# Patient Record
Sex: MALE | Race: WHITE | NOT HISPANIC OR LATINO | Employment: FULL TIME | URBAN - METROPOLITAN AREA
[De-identification: names, ages, dates, MRNs, and addresses within clinical notes are randomized per-mention and may not be internally consistent; named-entity substitution may affect disease eponyms.]

---

## 2023-02-23 LAB
ALBUMIN (MG/L) IN URINE: 7.7 MG/L
ALBUMIN/CREATININE (UG/MG) IN URINE: 5.5 UG/MG CRT (ref 0–30)
ANION GAP IN SER/PLAS: 8 MMOL/L (ref 10–20)
CALCIUM (MG/DL) IN SER/PLAS: 9.5 MG/DL (ref 8.6–10.3)
CARBON DIOXIDE, TOTAL (MMOL/L) IN SER/PLAS: 32 MMOL/L (ref 21–32)
CHLORIDE (MMOL/L) IN SER/PLAS: 102 MMOL/L (ref 98–107)
CHOLESTEROL (MG/DL) IN SER/PLAS: 185 MG/DL (ref 0–199)
CHOLESTEROL IN HDL (MG/DL) IN SER/PLAS: 48.9 MG/DL
CHOLESTEROL/HDL RATIO: 3.8
CREATININE (MG/DL) IN SER/PLAS: 0.83 MG/DL (ref 0.5–1.3)
CREATININE (MG/DL) IN URINE: 140 MG/DL (ref 20–370)
ESTIMATED AVERAGE GLUCOSE FOR HBA1C: 194 MG/DL
GFR MALE: >90 ML/MIN/1.73M2
GLUCOSE (MG/DL) IN SER/PLAS: 140 MG/DL (ref 74–99)
HEMOGLOBIN A1C/HEMOGLOBIN TOTAL IN BLOOD: 8.4 %
LDL: 114 MG/DL (ref 0–99)
POTASSIUM (MMOL/L) IN SER/PLAS: 4 MMOL/L (ref 3.5–5.3)
SODIUM (MMOL/L) IN SER/PLAS: 138 MMOL/L (ref 136–145)
THYROTROPIN (MIU/L) IN SER/PLAS BY DETECTION LIMIT <= 0.05 MIU/L: 1.04 MIU/L (ref 0.44–3.98)
TRIGLYCERIDE (MG/DL) IN SER/PLAS: 112 MG/DL (ref 0–149)
UREA NITROGEN (MG/DL) IN SER/PLAS: 12 MG/DL (ref 6–23)
VLDL: 22 MG/DL (ref 0–40)

## 2023-02-24 LAB — THYROPEROXIDASE AB (IU/ML) IN SER/PLAS: <28 IU/ML

## 2023-12-05 ENCOUNTER — APPOINTMENT (OUTPATIENT)
Dept: ENDOCRINOLOGY | Facility: CLINIC | Age: 28
End: 2023-12-05

## 2023-12-05 NOTE — PROGRESS NOTES
Subjective   Migel Singh is a 28 y.o. male who presents for a follow-up evaluation of Type 1 diabetes mellitus. The initial diagnosis of diabetes was made {ago/age:57005}. The patient {DOES/ DOES NOT:54074} have a known family history of diabetes.    Known complications due to diabetes included {Diagnoses; complications diabetes:1215}    Cardiovascular risk factors include {risk factors heart disease:510}. The patient {is/is not:65889} on an ACE inhibitor or angiotensin II receptor blocker.  The patient {HAS HAS NOT:71436} been previously hospitalized due to diabetic ketoacidosis.     Current symptoms/problems include {Symptoms; diabetes:73616}. His {course:30560}.     Current diabetes regimen is as follows: {therapy; diabetes meds:29474}     The patient {is/is not:69752} currently checking the blood glucose *** times per day.    Patient is using: {glucometer/continuous glucose monitor:08836}    Hypoglycemia frequency: ***  Hypoglycemia awareness: {YES/NO:39221}  Glucagon prescription:  {YES,NO:06627}    He works for [employer] Tuesday through Friday from 7am to 4pm. 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   5%of insulin is via bolus.     74% of the week is spent in auto mode     No carbs are recorded in the pump.      MEALS:   Breakfast - omits  Lunch - salad, apple, mandarin orange   DinnerÂ Â 6pm- chicken, green beans, corn   Snacks - fiber one bars, slim Rambo,   Beverages - energy drinks, Monster every 2 days, water with flavored packets, Gatorade zero, regular soda      Denies exercise regimen     Review of Systems    Objective   There were no vitals taken for this visit.  Physical Exam  Vitals and nursing note reviewed.    Constitutional:       General: He is not in acute distress.     Appearance: Normal appearance. He is normal weight.   HENT:      Head: Normocephalic and atraumatic.      Nose: Nose normal.      Mouth/Throat:      Mouth: Mucous membranes are moist.   Eyes:      Extraocular Movements: Extraocular movements intact.   Cardiovascular:      Rate and Rhythm: Normal rate and regular rhythm.   Pulmonary:      Effort: Pulmonary effort is normal.      Breath sounds: Normal breath sounds.   Musculoskeletal:         General: Normal range of motion.   Skin:     General: Skin is warm.   Neurological:      Mental Status: He is alert and oriented to person, place, and time.   Psychiatric:         Mood and Affect: Mood normal.         Lab Review  {Use .RESUFAST to pull relevant lab results into your note (This text will disappear):44463}    Health Maintenance:   Foot Exam:   Eye Exam:  Lipid Panel:  Urine Albumin:    Assessment/Plan      27-year-old presents for follow up for type 1 diabetes mellitus. His hemoglobin A1c was found to be 16.1% in January 2022. He was started on the Omnipod insulin pump. He is putting no carbs in the pump and thus is getting considerably more basal insulin compared to bolus.

## 2023-12-19 ENCOUNTER — OFFICE VISIT (OUTPATIENT)
Dept: ENDOCRINOLOGY | Facility: CLINIC | Age: 28
End: 2023-12-19
Payer: COMMERCIAL

## 2023-12-19 VITALS
HEART RATE: 94 BPM | SYSTOLIC BLOOD PRESSURE: 120 MMHG | DIASTOLIC BLOOD PRESSURE: 78 MMHG | HEIGHT: 68 IN | BODY MASS INDEX: 25.01 KG/M2 | WEIGHT: 165 LBS

## 2023-12-19 DIAGNOSIS — E10.9 TYPE 1 DIABETES MELLITUS WITHOUT COMPLICATION (MULTI): Primary | ICD-10-CM

## 2023-12-19 LAB — POC HEMOGLOBIN A1C: 12.6 % (ref 4.2–6.5)

## 2023-12-19 PROCEDURE — 95251 CONT GLUC MNTR ANALYSIS I&R: CPT | Performed by: INTERNAL MEDICINE

## 2023-12-19 PROCEDURE — 83036 HEMOGLOBIN GLYCOSYLATED A1C: CPT | Performed by: INTERNAL MEDICINE

## 2023-12-19 PROCEDURE — 3052F HG A1C>EQUAL 8.0%<EQUAL 9.0%: CPT | Performed by: INTERNAL MEDICINE

## 2023-12-19 PROCEDURE — 3074F SYST BP LT 130 MM HG: CPT | Performed by: INTERNAL MEDICINE

## 2023-12-19 PROCEDURE — 1036F TOBACCO NON-USER: CPT | Performed by: INTERNAL MEDICINE

## 2023-12-19 PROCEDURE — 3078F DIAST BP <80 MM HG: CPT | Performed by: INTERNAL MEDICINE

## 2023-12-19 PROCEDURE — 99214 OFFICE O/P EST MOD 30 MIN: CPT | Performed by: INTERNAL MEDICINE

## 2023-12-19 RX ORDER — BLOOD-GLUCOSE SENSOR
EACH MISCELLANEOUS
Qty: 10 EACH | Refills: 11 | Status: SHIPPED | OUTPATIENT
Start: 2023-12-19 | End: 2024-04-02 | Stop reason: SDUPTHER

## 2023-12-19 RX ORDER — INSULIN LISPRO 100 [IU]/ML
INJECTION, SOLUTION INTRAVENOUS; SUBCUTANEOUS
Status: ON HOLD | COMMUNITY
End: 2024-03-06 | Stop reason: ALTCHOICE

## 2023-12-19 RX ORDER — BLOOD-GLUCOSE SENSOR
EACH MISCELLANEOUS
COMMUNITY
End: 2023-12-19 | Stop reason: SDUPTHER

## 2023-12-19 RX ORDER — INSULIN PMP CART,AUT,G6/7,CNTR
1 EACH SUBCUTANEOUS
Qty: 10 EACH | Refills: 11 | Status: SHIPPED | OUTPATIENT
Start: 2023-12-19 | End: 2024-01-26 | Stop reason: SDUPTHER

## 2023-12-19 RX ORDER — LISDEXAMFETAMINE DIMESYLATE 70 MG/1
70 CAPSULE ORAL EVERY MORNING
COMMUNITY

## 2023-12-19 RX ORDER — FLUOXETINE HYDROCHLORIDE 20 MG/1
20 CAPSULE ORAL DAILY
COMMUNITY

## 2023-12-19 NOTE — PATIENT INSTRUCTIONS
Thank you for choosing [facility]  for your health care needs.  If you have any questions, concerns or medical needs, please feel free to contact our office at [phone].    Please ensure you complete your blood work one week before the next scheduled appointment.    For follow up in 3-4 months   To continue the use of your Dexcom CGM for the intensive glucose monitoring due to the dynamic nature of insulin requirements, the narrow therapeutic index of insulin and the potentially fatal consequences of treatment  To continue the use of the  Omnipod insulin pump   To put a new carb ratio at 18:00 to 12 grams

## 2023-12-19 NOTE — PROGRESS NOTES
"Subjective   Migel Singh is a 28 y.o. male who presents for a follow-up evaluation of Type 1 diabetes mellitus. The initial diagnosis of diabetes was made  at the age of 24 years .     He has had no major changes to his health since his last appointment.  He states that his cuts that take a long time to heel or will get infected.      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      He has not had the Dexcom CGM for one month     Known complications due to diabetes included none    Cardiovascular risk factors include diabetes mellitus and male gender. The patient is not on an ACE inhibitor or angiotensin II receptor blocker.  The patient has not been previously hospitalized due to diabetic ketoacidosis.     Current symptoms/problems include hyperglycemia. His clinical course has been stable.     The patient is currently checking the blood glucose multiple times per day.    Patient is using: continuous glucose monitor  DEXCOM G6 CGM DATA:  Average 298 +/- 85mg/dL  0% of values below target range  10% of values within target range  90% of values above target range    Hypoglycemia frequency: 0%  Hypoglycemia awareness: Yes      OMNIPOD SETTINGS:  Basal - 28.1 units/day  MN 1.2 units/hr  12:00 1 unit/hr  15:30 1.2 units/hr     I:C 15 grams     Sensitivity 70mg/dL      Target 130mg/dL      AIT - 3 hours         MEALS:   Breakfast - omits  Lunch - goes home for luch; granola bar   Dinner6:30pm- chicken, steak, potaotes  Snacks - fiber one bars, slim Rambo,   Beverages - energy drinks, Monster every 2 days, water with flavored packets, Gatorade zero, regular soda, coffee with creamer      Denies exercise regimen       Objective   /78 (BP Location: Left arm, Patient Position: Sitting, BP Cuff Size: Adult)   Pulse 94   Ht 1.727 m (5' 8\")   Wt 74.8 kg (165 lb)   BMI 25.09 kg/m²   Physical Exam  Vitals and nursing note reviewed.   Constitutional:       General: He is not in acute distress.     Appearance: Normal " "appearance. He is normal weight.   HENT:      Head: Normocephalic and atraumatic.      Nose: Nose normal.      Mouth/Throat:      Mouth: Mucous membranes are moist.   Eyes:      Extraocular Movements: Extraocular movements intact.   Cardiovascular:      Rate and Rhythm: Normal rate and regular rhythm.   Pulmonary:      Effort: Pulmonary effort is normal.      Breath sounds: Normal breath sounds.   Musculoskeletal:         General: Normal range of motion.   Skin:     General: Skin is warm.   Neurological:      Mental Status: He is alert and oriented to person, place, and time.   Psychiatric:         Mood and Affect: Mood normal.         Lab Review  Lab Results   Component Value Date    HGBA1C 12.6 (A) 12/19/2023     Lab Results   Component Value Date    GLUCOSE 140 (H) 02/23/2023    CALCIUM 9.5 02/23/2023     02/23/2023    K 4.0 02/23/2023    CO2 32 02/23/2023     02/23/2023    BUN 12 02/23/2023    CREATININE 0.83 02/23/2023     Lab Results   Component Value Date    CHOL 185 02/23/2023    CHOL 171 03/25/2021    CHOL 168 02/25/2020     Lab Results   Component Value Date    HDL 48.9 02/23/2023    HDL 41.2 03/25/2021    HDL 45.1 02/25/2020     No results found for: \"LDLCALC\"  Lab Results   Component Value Date    TRIG 112 02/23/2023    TRIG 87 03/25/2021    TRIG 133 02/25/2020     No components found for: \"CHOLHDL\"      Health Maintenance:   Foot Exam: August 8, 2023  Eye Exam:  Urine Albumin:  February 23, 2023    Assessment/Plan      28-year-old presents for follow up for type 1 diabetes mellitus. His blood pressure is at goal today.    Poorly controlled type 1 diabetes mellitus (CMS/[facility])    To continue the use of your Dexcom CGM for the intensive glucose monitoring due to the dynamic nature of insulin requirements, the narrow therapeutic index of insulin and the potentially fatal consequences of treatment  To continue the use of the  Omnipod insulin pump   To put a new carb ratio at 18:00 to 12 grams "   All other pump settings remain the same  Counseled that the goal A1C should be 7% or less  Counseled glycemic control is warranted to prevent microvascular complications      Insulin pump titration  To change pump sites every three days  To utilize automode for improved glycemic control    For follow up in 3-4 months

## 2023-12-21 PROBLEM — Z46.81 INSULIN PUMP TITRATION: Status: ACTIVE | Noted: 2023-12-21

## 2023-12-21 PROBLEM — E10.65 POORLY CONTROLLED TYPE 1 DIABETES MELLITUS (MULTI): Status: ACTIVE | Noted: 2023-12-21

## 2023-12-22 NOTE — ASSESSMENT & PLAN NOTE
To continue the use of your Dexcom CGM for the intensive glucose monitoring due to the dynamic nature of insulin requirements, the narrow therapeutic index of insulin and the potentially fatal consequences of treatment  To continue the use of the  Omnipod insulin pump   To put a new carb ratio at 18:00 to 12 grams   All other pump settings remain the same  Counseled that the goal A1C should be 7% or less  Counseled glycemic control is warranted to prevent microvascular complications

## 2023-12-22 NOTE — ASSESSMENT & PLAN NOTE
To change pump sites every three days  To utilize automode for improved glycemic control    For follow up in 3-4 months

## 2023-12-26 RX ORDER — BLOOD-GLUCOSE TRANSMITTER
EACH MISCELLANEOUS
COMMUNITY
Start: 2023-11-03 | End: 2024-04-03 | Stop reason: SDUPTHER

## 2023-12-26 NOTE — TELEPHONE ENCOUNTER
Patient called to request dexcom transmitter renewal. System shows dispenses available. Patient states he was trying to fill rx early because he lost his transmitter. Patient will be given a sample from our office. Patient has been advised to contact insurance in the future regarding allowed amount.

## 2024-01-26 DIAGNOSIS — E10.9 TYPE 1 DIABETES MELLITUS WITHOUT COMPLICATION (MULTI): ICD-10-CM

## 2024-01-26 RX ORDER — INSULIN PMP CART,AUT,G6/7,CNTR
1 EACH SUBCUTANEOUS
Qty: 10 EACH | Refills: 11 | Status: SHIPPED | OUTPATIENT
Start: 2024-01-26 | End: 2024-04-02 | Stop reason: SDUPTHER

## 2024-02-26 ENCOUNTER — HOSPITAL ENCOUNTER (OUTPATIENT)
Dept: RADIOLOGY | Facility: CLINIC | Age: 29
Discharge: HOME | End: 2024-02-26
Payer: COMMERCIAL

## 2024-02-26 ENCOUNTER — OFFICE VISIT (OUTPATIENT)
Dept: PRIMARY CARE | Facility: CLINIC | Age: 29
End: 2024-02-26
Payer: COMMERCIAL

## 2024-02-26 VITALS
SYSTOLIC BLOOD PRESSURE: 124 MMHG | WEIGHT: 163 LBS | BODY MASS INDEX: 24.71 KG/M2 | HEART RATE: 124 BPM | DIASTOLIC BLOOD PRESSURE: 80 MMHG | HEIGHT: 68 IN

## 2024-02-26 DIAGNOSIS — M54.16 LUMBAR BACK PAIN WITH RADICULOPATHY AFFECTING LEFT LOWER EXTREMITY: ICD-10-CM

## 2024-02-26 DIAGNOSIS — E10.65 POORLY CONTROLLED TYPE 1 DIABETES MELLITUS (MULTI): ICD-10-CM

## 2024-02-26 DIAGNOSIS — M54.16 LUMBAR BACK PAIN WITH RADICULOPATHY AFFECTING LEFT LOWER EXTREMITY: Primary | ICD-10-CM

## 2024-02-26 PROBLEM — R73.9 HYPERGLYCEMIA: Status: ACTIVE | Noted: 2024-02-24

## 2024-02-26 PROBLEM — Z96.41 INSULIN PUMP STATUS: Status: ACTIVE | Noted: 2024-02-26

## 2024-02-26 PROBLEM — R20.0 NUMBNESS OF LEFT HAND: Status: ACTIVE | Noted: 2024-02-26

## 2024-02-26 PROBLEM — E10.9 TYPE 1 DIABETES MELLITUS (MULTI): Status: ACTIVE | Noted: 2024-02-26

## 2024-02-26 PROBLEM — R20.2 NUMBNESS AND TINGLING: Status: ACTIVE | Noted: 2024-02-26

## 2024-02-26 PROBLEM — G56.22 ULNAR NEUROPATHY OF LEFT UPPER EXTREMITY: Status: ACTIVE | Noted: 2024-02-26

## 2024-02-26 PROBLEM — R12 HEARTBURN: Status: ACTIVE | Noted: 2024-02-26

## 2024-02-26 PROBLEM — R21 RASH IN ADULT: Status: ACTIVE | Noted: 2024-02-26

## 2024-02-26 PROBLEM — R20.0 NUMBNESS AND TINGLING: Status: ACTIVE | Noted: 2024-02-26

## 2024-02-26 PROBLEM — E10.9 TYPE 1 DIABETES MELLITUS (MULTI): Status: RESOLVED | Noted: 2024-02-26 | Resolved: 2024-02-26

## 2024-02-26 PROCEDURE — 72148 MRI LUMBAR SPINE W/O DYE: CPT | Performed by: RADIOLOGY

## 2024-02-26 PROCEDURE — 72148 MRI LUMBAR SPINE W/O DYE: CPT

## 2024-02-26 PROCEDURE — 3074F SYST BP LT 130 MM HG: CPT | Performed by: FAMILY MEDICINE

## 2024-02-26 PROCEDURE — 1036F TOBACCO NON-USER: CPT | Performed by: FAMILY MEDICINE

## 2024-02-26 PROCEDURE — 3079F DIAST BP 80-89 MM HG: CPT | Performed by: FAMILY MEDICINE

## 2024-02-26 PROCEDURE — 99214 OFFICE O/P EST MOD 30 MIN: CPT | Performed by: FAMILY MEDICINE

## 2024-02-26 RX ORDER — MELOXICAM 7.5 MG/1
7.5 TABLET ORAL 2 TIMES DAILY
Qty: 60 TABLET | Refills: 0 | Status: SHIPPED | OUTPATIENT
Start: 2024-02-26 | End: 2024-02-27 | Stop reason: SDUPTHER

## 2024-02-26 ASSESSMENT — PATIENT HEALTH QUESTIONNAIRE - PHQ9
1. LITTLE INTEREST OR PLEASURE IN DOING THINGS: NOT AT ALL
SUM OF ALL RESPONSES TO PHQ9 QUESTIONS 1 AND 2: 0
SUM OF ALL RESPONSES TO PHQ9 QUESTIONS 1 AND 2: 0
2. FEELING DOWN, DEPRESSED OR HOPELESS: NOT AT ALL
1. LITTLE INTEREST OR PLEASURE IN DOING THINGS: NOT AT ALL
2. FEELING DOWN, DEPRESSED OR HOPELESS: NOT AT ALL

## 2024-02-26 NOTE — ASSESSMENT & PLAN NOTE
Please make sure you continue to follow with endocrinology as your diabetes was very poorly controlled while in the ED

## 2024-02-26 NOTE — ASSESSMENT & PLAN NOTE
I highly recommend obtaining a stat MRI of the lumbar spine, as I suspect the presence of a disc herniation potentially causing nerve root compression at the L5/S1 level, warranting consideration for a discectomy. I advise initiating Mobic at a dosage of 7.5 mg twice daily to assist with pain management and inflammation. It is preferable to avoid the use of prednisone again due to the recent acute elevation in your blood sugar levels. At this time, I recommend refraining from chiropractic care. Depending on the findings from the MRI, it may be necessary to promptly arrange a surgical consultation.

## 2024-02-26 NOTE — PROGRESS NOTES
Subjective   Patient ID: Migel Singh is a 28 y.o. male who presents for Hospital Follow-up and Back Pain (Lower back pain for the last few weeks).    Past Medical, Surgical, and Family History reviewed and updated in chart.    Reviewed all medications by prescribing practitioner or clinical pharmacist (such as prescriptions, OTCs, herbal therapies and supplements) and documented in the medical record.    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 Due to the intensity of the pain, he sought care at the [facility] ED twice, on February 21 and again on February 23. Initially, he received an injection of Toradol and was prescribed Flexeril, followed by Robaxin during the second visit. Additionally, he was prescribed a 5-day course of prednisone at a dosage of 20 mg twice daily, which he believes led to a significant elevation in his blood glucose levels, peaking at 1084.    Upon reviewing the [facility] ED note, it was recommended that Max be admitted to the ICU; however, he chose to leave against medical advice (AMA) and has been managing his blood glucose levels at home with insulin. During the office visit, his continuous glucose monitor displayed a blood sugar reading of 179.    Max continues to endure severe left lower back pain that frequently impedes his ability to walk and function adequately at work. An assessment of his medical records reveals that the most recent MRI available dates back to 2015, indicating degenerative changes at the L5/S1 level. He mentioned undergoing an MRI within the past year through a chiropractor, which revealed bulging disks at L4/5 and L5/S1, although the report is not available in our records.    For the past year, Max has been under the care of a chiropractor for his back  condition, which had previously maintained stability until the recent exacerbation. Despite ongoing chiropractic treatment, there has been no alleviation in his pain level, consistently reported as 10/10, with associated weakness and difficulty in ambulation due to numbness in the left lower extremity.    During his chiropractic sessions involving traction therapy, Max noted temporary improvement following each session, prompting a month-long break. However, the pain resurfaced and intensified following the aforementioned painting incident.    Review of Systems  All pertinent positive symptoms are included in the history of present illness.    All other systems have been reviewed and are negative and noncontributory to this patient's current ailments.    Past Medical History:   Diagnosis Date    Bipolar disorder, current episode manic without psychotic features, unspecified (CMS/[facility]) 03/25/2021    Bipolar affective, manic    Gastro-esophageal reflux disease with esophagitis, without bleeding 12/14/2022    Gastroesophageal reflux disease with esophagitis     Past Surgical History:   Procedure Laterality Date    ELBOW SURGERY  12/06/2013    Elbow Surgery     Social History     Tobacco Use    Smoking status: Never     Passive exposure: Never    Smokeless tobacco: Never    Tobacco comments:     vape   Substance Use Topics    Alcohol use: Yes     Alcohol/week: 4.0 standard drinks of alcohol     Types: 1 Cans of beer, 3 Shots of liquor per week     Comment: occasionally    Drug use: Yes     Types: Marijuana     No family history on file.  Immunization History   Administered Date(s) Administered    DTaP, Unspecified 02/01/2000    Hepatitis B vaccine, adult (RECOMBIVAX, ENGERIX) 05/09/2013, 06/19/2013, 10/08/2013    Influenza, seasonal, injectable 10/08/2013    MMR vaccine, subcutaneous (MMR II) 02/01/2000    Tdap vaccine, age 7 year and older (BOOSTRIX, ADACEL) 11/15/2017     Current Outpatient Medications   Medication  "Instructions    blood-glucose sensor (Dexcom G6 Sensor) device To be changed every 10 days    Dexcom G6 Transmitter device USE AS DIRECTED FOR CONTINUOUS GLUCOSE MONITORING CHANGE EVERY 3 MONTHS    FLUoxetine (PROZAC) 20 mg, oral, Daily    insulin lispro (HumaLOG) 100 unit/mL injection subcutaneous, 3 times daily with meals, Take as directed per insulin instructions.    insulin pump cart,auto,BT/cntr (OMNIPOD 5 G6 INTRO KIT, GEN 5, SUBQ) subcutaneous    insulin pump cart,automated,BT (Omnipod 5 G6 Pods, Gen 5,) cartridge 1 each, subcutaneous, Every 72 hours    lisdexamfetamine (VYVANSE) 70 mg, oral, Every morning    meloxicam (MOBIC) 7.5 mg, oral, 2 times daily     No Known Allergies    Objective   Vitals:    02/26/24 1344   BP: 124/80   Pulse: (!) 124   Weight: 73.9 kg (163 lb)   Height: 1.727 m (5' 8\")     Body mass index is 24.78 kg/m².    BP Readings from Last 3 Encounters:   02/26/24 124/80   02/23/24 133/84   12/19/23 120/78      Wt Readings from Last 3 Encounters:   02/26/24 73.9 kg (163 lb)   02/23/24 77.1 kg (169 lb 15.6 oz)   12/19/23 74.8 kg (165 lb)        No visits with results within 1 Month(s) from this visit.   Latest known visit with results is:   Office Visit on 12/19/2023   Component Date Value    POC HEMOGLOBIN A1c 12/19/2023 12.6 (A)      Physical Exam  CONSTITUTIONAL - well nourished, well developed, looks like stated age, in obvious acute pain, not ill-appearing, and not tired appearing  SKIN - normal skin color and pigmentation, normal skin turgor without rash, lesions, or nodules visualized  HEAD - no trauma, normocephalic  EYES - pupils are equal and reactive to light, extraocular muscles are intact, and normal external exam  EXTREMITIES - no obvious or evident edema, no obvious or evident deformities; reproducible tenderness in the lumbar paraspinal muscles on the left at L3, but no reproducible tenderness at L4-L5; no spinous process tenderness  NEUROLOGICAL - positive straight leg " raising on the left at 25 degrees with pretty exquisite tenderness in the lower back in the left lower extremity; gait with slight limp secondary to the pain  PSYCHIATRIC - alert, pleasant and cordial, age-appropriate    Assessment/Plan   Problem List Items Addressed This Visit       Poorly controlled type 1 diabetes mellitus (CMS/HCC)     Please make sure you continue to follow with endocrinology as your diabetes was very poorly controlled while in the ED         Lumbar back pain with radiculopathy affecting left lower extremity - Primary     I highly recommend obtaining a stat MRI of the lumbar spine, as I suspect the presence of a disc herniation potentially causing nerve root compression at the L5/S1 level, warranting consideration for a discectomy. I advise initiating Mobic at a dosage of 7.5 mg twice daily to assist with pain management and inflammation. It is preferable to avoid the use of prednisone again due to the recent acute elevation in your blood sugar levels. At this time, I recommend refraining from chiropractic care. Depending on the findings from the MRI, it may be necessary to promptly arrange a surgical consultation.         Relevant Medications    meloxicam (Mobic) 7.5 mg tablet    Other Relevant Orders    MR lumbar spine wo IV contrast   All ED records were reviewed from [facility]  No imaging was accomplished while in the ED  Unable to evaluate chiropractic imaging at this juncture  Risks, benefits, and options of treatment(s) were discussed after reviewing all current medication(s) and drug allergy(ies)  I opted for the treatment that we discussed with instructions on the medication use for your underlying medical ailment(s)  I encouraged supportive care such as rest, fluids and Tylenol as warranted

## 2024-02-27 ENCOUNTER — TRANSCRIBE ORDERS (OUTPATIENT)
Dept: ORTHOPEDIC SURGERY | Facility: HOSPITAL | Age: 29
End: 2024-02-27

## 2024-02-27 ENCOUNTER — OFFICE VISIT (OUTPATIENT)
Dept: ORTHOPEDIC SURGERY | Facility: CLINIC | Age: 29
End: 2024-02-27
Payer: COMMERCIAL

## 2024-02-27 DIAGNOSIS — Z01.812 ENCOUNTER FOR PRE-OPERATIVE LABORATORY TESTING: ICD-10-CM

## 2024-02-27 DIAGNOSIS — M54.16 LUMBAR BACK PAIN WITH RADICULOPATHY AFFECTING LEFT LOWER EXTREMITY: ICD-10-CM

## 2024-02-27 DIAGNOSIS — M54.16 LUMBAR RADICULOPATHY: ICD-10-CM

## 2024-02-27 DIAGNOSIS — M51.26 RUPTURED LUMBAR DISC: ICD-10-CM

## 2024-02-27 DIAGNOSIS — M48.062 LUMBAR STENOSIS WITH NEUROGENIC CLAUDICATION: ICD-10-CM

## 2024-02-27 DIAGNOSIS — M51.26 DISPLACEMENT OF LUMBAR INTERVERTEBRAL DISC WITHOUT MYELOPATHY: Primary | ICD-10-CM

## 2024-02-27 PROCEDURE — 99215 OFFICE O/P EST HI 40 MIN: CPT | Performed by: ORTHOPAEDIC SURGERY

## 2024-02-27 PROCEDURE — 1036F TOBACCO NON-USER: CPT | Performed by: ORTHOPAEDIC SURGERY

## 2024-02-27 PROCEDURE — 99205 OFFICE O/P NEW HI 60 MIN: CPT | Performed by: ORTHOPAEDIC SURGERY

## 2024-02-27 RX ORDER — MELOXICAM 15 MG/1
15 TABLET ORAL DAILY
Qty: 30 TABLET | Refills: 0 | Status: SHIPPED | OUTPATIENT
Start: 2024-02-27 | End: 2024-03-06 | Stop reason: HOSPADM

## 2024-02-27 NOTE — RESULT ENCOUNTER NOTE
MRI of the lumbar spine has a large disc herniation at L5/S1 which I suspect will likely require surgical intervention.  I have communicated with Dr. Gustavo Randolph in spine surgery, hoping that he will see you ASAP.  I suspect you will get a communication from his office when he arrives today in the office.

## 2024-02-27 NOTE — H&P (VIEW-ONLY)
HPI:Migel Singh is a 28-year-old man, who comes in with several months of back pain and leg pain.  Leg pain got acutely worse last week.  It has been in the right leg mostly for the last couple months and more recently is going to the left pain.  He has been to the emergency room on 2 occasions.  He has pain in the legs which is constant.  He has numbness and tingling into the groin.  He had 1 episode of urinary incontinence.  He has been seeing a chiropractor, Dr. Rosa Macdonald.  Despite regular chiropractic treatment over the course of last 6 months, his symptoms have not improved.  He comes in review his MRI.      ROS:  Reviewed on EMR and patient intake sheet.    PMH/SH:  Reviewed on EMR and patient intake sheet.    Exam:  Physical Exam    Constitutional: Well appearing; no acute distress  Eyes: pupils are equal and round  Psych: normal affect  Respiratory: non-labored breathing  Cardiovascular: regular rate and rhythm  GI: non-distended abdomen  Musculoskeletal: no pain with range of motion of the hips bilaterally  Neurologic: [4-]/5 strength in the lower extremities bilaterally]; [markedly positive bilateral] straight leg raise    Radiology:     MRI was personally reviewed.  It demonstrates a massive L5-S1 disc herniation with an extruded fragment.  The disc fragments fill up 90% of the nerve canal.  There is severe compression of the thecal sac    Diagnosis:    L5-S1 disc herniation; lumbar radiculopathy; lumbar stenosis    Assessment and Plan:   28-year-old male with a massive L5-S1 disc herniation producing intractable lumbar radiculopathy and severe spinal stenosis.  He has attempted nonoperative management.  Further nonoperative management and/or physical therapy is currently contraindicated secondary to his intractable pain, and his neurologic symptoms including recent urinary incontinence.  He will require an L5-S1 microdiscectomy and decompression.  We talked about that surgery today.  CPT  code: 39152.    We discussed surgery today at length, including the specifics of the actual proposed procedure, the projected hospital course and post-operative recovery or rehabilitation, and the expected results of this surgery.  I delineated the potential benefits and risks of the proposed surgery, including, but not limited to those of infection, spinal fluid leaks, nerve injury or paralysis, whether that be complete or partial paralysis, foot drop, instrumentation failure, non-union or latent instability, future adjacent segment disease, epidural hematoma, wound dehiscence, reherniation, persistent pain or paresthesias, and the general risks of anaesthesia including, but not limited to those of stroke, heart attack, respiratory difficulties and death.  The patient understands that there are no guarantees in regard to outcome or potential complications associated with the proposed procedure.  After this discussion, the patient articulated understanding of the topics covered and felt that all questions had been covered and answered satisfactorily.    The patient would like to proceed a soon as possible.    The patient was in agreement with the plan. At the end of the visit today, the patient felt that all questions had been answered satisfactorily.  The patient was pleased with the visit and very appreciative for the care rendered.     Thank you very much for the kind referral.  It is a privilege, and a pleasure, to partner with you in the care of your patients.  I would be delighted to assist you with any further consultations as needed.          Gustavo Randolph MD    Chief of Spine Surgery, [facility]  Director of Spine Service, [facility]  , Department of Orthopaedics  [facility]  [address] [place] [address]  P: [phone]  [facility]    This note was dictated with  voice recognition software.  It has not been proofread for grammatical errors, typographical mistakes or other semantic inconsistencies.

## 2024-02-27 NOTE — PROGRESS NOTES
HPI:Migel Singh is a 28-year-old man, who comes in with several months of back pain and leg pain.  Leg pain got acutely worse last week.  It has been in the right leg mostly for the last couple months and more recently is going to the left pain.  He has been to the emergency room on 2 occasions.  He has pain in the legs which is constant.  He has numbness and tingling into the groin.  He had 1 episode of urinary incontinence.  He has been seeing a chiropractor, Dr. Rosa Macdonald.  Despite regular chiropractic treatment over the course of last 6 months, his symptoms have not improved.  He comes in review his MRI.      ROS:  Reviewed on EMR and patient intake sheet.    PMH/SH:  Reviewed on EMR and patient intake sheet.    Exam:  Physical Exam    Constitutional: Well appearing; no acute distress  Eyes: pupils are equal and round  Psych: normal affect  Respiratory: non-labored breathing  Cardiovascular: regular rate and rhythm  GI: non-distended abdomen  Musculoskeletal: no pain with range of motion of the hips bilaterally  Neurologic: [4-]/5 strength in the lower extremities bilaterally]; [markedly positive bilateral] straight leg raise    Radiology:     MRI was personally reviewed.  It demonstrates a massive L5-S1 disc herniation with an extruded fragment.  The disc fragments fill up 90% of the nerve canal.  There is severe compression of the thecal sac    Diagnosis:    L5-S1 disc herniation; lumbar radiculopathy; lumbar stenosis    Assessment and Plan:   28-year-old male with a massive L5-S1 disc herniation producing intractable lumbar radiculopathy and severe spinal stenosis.  He has attempted nonoperative management.  Further nonoperative management and/or physical therapy is currently contraindicated secondary to his intractable pain, and his neurologic symptoms including recent urinary incontinence.  He will require an L5-S1 microdiscectomy and decompression.  We talked about that surgery today.  CPT  code: 45046.    We discussed surgery today at length, including the specifics of the actual proposed procedure, the projected hospital course and post-operative recovery or rehabilitation, and the expected results of this surgery.  I delineated the potential benefits and risks of the proposed surgery, including, but not limited to those of infection, spinal fluid leaks, nerve injury or paralysis, whether that be complete or partial paralysis, foot drop, instrumentation failure, non-union or latent instability, future adjacent segment disease, epidural hematoma, wound dehiscence, reherniation, persistent pain or paresthesias, and the general risks of anaesthesia including, but not limited to those of stroke, heart attack, respiratory difficulties and death.  The patient understands that there are no guarantees in regard to outcome or potential complications associated with the proposed procedure.  After this discussion, the patient articulated understanding of the topics covered and felt that all questions had been covered and answered satisfactorily.    The patient would like to proceed a soon as possible.    The patient was in agreement with the plan. At the end of the visit today, the patient felt that all questions had been answered satisfactorily.  The patient was pleased with the visit and very appreciative for the care rendered.     Thank you very much for the kind referral.  It is a privilege, and a pleasure, to partner with you in the care of your patients.  I would be delighted to assist you with any further consultations as needed.          Gustavo Randolph MD    Chief of Spine Surgery, [facility]  Director of Spine Service, [facility]  , Department of Orthopaedics  [facility]  [address] [place] [address]  P: [phone]  [facility]    This note was dictated with  voice recognition software.  It has not been proofread for grammatical errors, typographical mistakes or other semantic inconsistencies.

## 2024-02-27 NOTE — LETTER
February 27, 2024     Gold Solorio DO  [address]  [facility] [place]   [id number]    Patient: Migel Singh   YOB: 1995   Date of Visit: 2/27/2024       Dear Dr. Gold Solorio, :    Thank you for referring Migel Singh to me for evaluation. Below are my notes for this consultation.  If you have questions, please do not hesitate to call me. I look forward to following your patient along with you.       Sincerely,     Gutsavo Randolph MD      CC: No Recipients  ______________________________________________________________________________________    HPI:Migel Singh is a 28-year-old man, who comes in with several months of back pain and leg pain.  Leg pain got acutely worse last week.  It has been in the right leg mostly for the last couple months and more recently is going to the left pain.  He has been to the emergency room on 2 occasions.  He has pain in the legs which is constant.  He has numbness and tingling into the groin.  He had 1 episode of urinary incontinence.  He has been seeing a chiropractor, Dr. Rosa Macdonald.  Despite regular chiropractic treatment over the course of last 6 months, his symptoms have not improved.  He comes in review his MRI.      ROS:  Reviewed on EMR and patient intake sheet.    PMH/SH:  Reviewed on EMR and patient intake sheet.    Exam:  Physical Exam    Constitutional: Well appearing; no acute distress  Eyes: pupils are equal and round  Psych: normal affect  Respiratory: non-labored breathing  Cardiovascular: regular rate and rhythm  GI: non-distended abdomen  Musculoskeletal: no pain with range of motion of the hips bilaterally  Neurologic: [4-]/5 strength in the lower extremities bilaterally]; [markedly positive bilateral] straight leg raise    Radiology:     MRI was personally reviewed.  It demonstrates a massive L5-S1 disc herniation with an extruded fragment.  The disc fragments fill up 90% of the nerve canal.   There is severe compression of the thecal sac    Diagnosis:    L5-S1 disc herniation; lumbar radiculopathy; lumbar stenosis    Assessment and Plan:   28-year-old male with a massive L5-S1 disc herniation producing intractable lumbar radiculopathy and severe spinal stenosis.  He has attempted nonoperative management.  Further nonoperative management and/or physical therapy is currently contraindicated secondary to his intractable pain, and his neurologic symptoms including recent urinary incontinence.  He will require an L5-S1 microdiscectomy and decompression.  We talked about that surgery today.  CPT code: 96699.    We discussed surgery today at length, including the specifics of the actual proposed procedure, the projected hospital course and post-operative recovery or rehabilitation, and the expected results of this surgery.  I delineated the potential benefits and risks of the proposed surgery, including, but not limited to those of infection, spinal fluid leaks, nerve injury or paralysis, whether that be complete or partial paralysis, foot drop, instrumentation failure, non-union or latent instability, future adjacent segment disease, epidural hematoma, wound dehiscence, reherniation, persistent pain or paresthesias, and the general risks of anaesthesia including, but not limited to those of stroke, heart attack, respiratory difficulties and death.  The patient understands that there are no guarantees in regard to outcome or potential complications associated with the proposed procedure.  After this discussion, the patient articulated understanding of the topics covered and felt that all questions had been covered and answered satisfactorily.    The patient would like to proceed a soon as possible.    The patient was in agreement with the plan. At the end of the visit today, the patient felt that all questions had been answered satisfactorily.  The patient was pleased with the visit and very appreciative for  the care rendered.     Thank you very much for the kind referral.  It is a privilege, and a pleasure, to partner with you in the care of your patients.  I would be delighted to assist you with any further consultations as needed.          Gustavo Randolph MD    Chief of Spine Surgery, [facility]  Director of Spine Service, [facility]  , Department of Orthopaedics  [facility] [employer] [facility]  [address] [id number]  P: [phone]  [facility]    This note was dictated with voice recognition software.  It has not been proofread for grammatical errors, typographical mistakes or other semantic inconsistencies.

## 2024-02-28 PROBLEM — M51.26 RUPTURED LUMBAR DISC: Status: ACTIVE | Noted: 2024-02-27

## 2024-02-28 PROBLEM — M54.16 LUMBAR RADICULOPATHY: Status: ACTIVE | Noted: 2024-02-27

## 2024-03-05 ENCOUNTER — LAB (OUTPATIENT)
Dept: LAB | Facility: LAB | Age: 29
End: 2024-03-05
Payer: COMMERCIAL

## 2024-03-05 DIAGNOSIS — Z01.812 ENCOUNTER FOR PRE-OPERATIVE LABORATORY TESTING: ICD-10-CM

## 2024-03-05 LAB
ALBUMIN SERPL BCP-MCNC: 4.4 G/DL (ref 3.4–5)
ALP SERPL-CCNC: 102 U/L (ref 33–120)
ALT SERPL W P-5'-P-CCNC: 22 U/L (ref 10–52)
ANION GAP SERPL CALC-SCNC: 14 MMOL/L (ref 10–20)
AST SERPL W P-5'-P-CCNC: 17 U/L (ref 9–39)
BILIRUB SERPL-MCNC: 0.4 MG/DL (ref 0–1.2)
BUN SERPL-MCNC: 19 MG/DL (ref 6–23)
CALCIUM SERPL-MCNC: 9.1 MG/DL (ref 8.6–10.3)
CHLORIDE SERPL-SCNC: 89 MMOL/L (ref 98–107)
CO2 SERPL-SCNC: 26 MMOL/L (ref 21–32)
CREAT SERPL-MCNC: 1.04 MG/DL (ref 0.5–1.3)
EGFRCR SERPLBLD CKD-EPI 2021: >90 ML/MIN/1.73M*2
ERYTHROCYTE [DISTWIDTH] IN BLOOD BY AUTOMATED COUNT: 12.9 % (ref 11.5–14.5)
GLUCOSE SERPL-MCNC: 782 MG/DL (ref 74–99)
HCT VFR BLD AUTO: 45.7 % (ref 41–52)
HGB BLD-MCNC: 15.9 G/DL (ref 13.5–17.5)
INR PPP: 0.9 (ref 0.9–1.1)
MCH RBC QN AUTO: 29.9 PG (ref 26–34)
MCHC RBC AUTO-ENTMCNC: 34.8 G/DL (ref 32–36)
MCV RBC AUTO: 86 FL (ref 80–100)
NRBC BLD-RTO: 0 /100 WBCS (ref 0–0)
PLATELET # BLD AUTO: 281 X10*3/UL (ref 150–450)
POTASSIUM SERPL-SCNC: 4.9 MMOL/L (ref 3.5–5.3)
PROT SERPL-MCNC: 6.9 G/DL (ref 6.4–8.2)
PROTHROMBIN TIME: 10.3 SECONDS (ref 9.8–12.8)
RBC # BLD AUTO: 5.32 X10*6/UL (ref 4.5–5.9)
SODIUM SERPL-SCNC: 124 MMOL/L (ref 136–145)
WBC # BLD AUTO: 6.5 X10*3/UL (ref 4.4–11.3)

## 2024-03-05 PROCEDURE — 85610 PROTHROMBIN TIME: CPT

## 2024-03-05 PROCEDURE — 85027 COMPLETE CBC AUTOMATED: CPT

## 2024-03-05 PROCEDURE — 36415 COLL VENOUS BLD VENIPUNCTURE: CPT

## 2024-03-05 PROCEDURE — 80053 COMPREHEN METABOLIC PANEL: CPT

## 2024-03-06 ENCOUNTER — APPOINTMENT (OUTPATIENT)
Dept: RADIOLOGY | Facility: HOSPITAL | Age: 29
End: 2024-03-06
Payer: COMMERCIAL

## 2024-03-06 ENCOUNTER — ANESTHESIA EVENT (OUTPATIENT)
Dept: OPERATING ROOM | Facility: HOSPITAL | Age: 29
End: 2024-03-06
Payer: COMMERCIAL

## 2024-03-06 ENCOUNTER — HOSPITAL ENCOUNTER (OUTPATIENT)
Facility: HOSPITAL | Age: 29
Setting detail: OUTPATIENT SURGERY
Discharge: HOME | End: 2024-03-06
Attending: ORTHOPAEDIC SURGERY | Admitting: ORTHOPAEDIC SURGERY
Payer: COMMERCIAL

## 2024-03-06 ENCOUNTER — ANESTHESIA (OUTPATIENT)
Dept: OPERATING ROOM | Facility: HOSPITAL | Age: 29
End: 2024-03-06
Payer: COMMERCIAL

## 2024-03-06 VITALS
HEIGHT: 68 IN | BODY MASS INDEX: 23.92 KG/M2 | DIASTOLIC BLOOD PRESSURE: 72 MMHG | OXYGEN SATURATION: 99 % | HEART RATE: 69 BPM | SYSTOLIC BLOOD PRESSURE: 145 MMHG | TEMPERATURE: 97.9 F | RESPIRATION RATE: 16 BRPM | WEIGHT: 157.85 LBS

## 2024-03-06 DIAGNOSIS — T40.2X5A CONSTIPATION DUE TO OPIOID THERAPY: ICD-10-CM

## 2024-03-06 DIAGNOSIS — G89.18 POSTOPERATIVE PAIN AFTER SPINAL SURGERY: ICD-10-CM

## 2024-03-06 DIAGNOSIS — Z98.890 POSTOPERATIVE NAUSEA: ICD-10-CM

## 2024-03-06 DIAGNOSIS — M54.16 LUMBAR RADICULOPATHY: ICD-10-CM

## 2024-03-06 DIAGNOSIS — K59.03 CONSTIPATION DUE TO OPIOID THERAPY: ICD-10-CM

## 2024-03-06 DIAGNOSIS — R11.0 POSTOPERATIVE NAUSEA: ICD-10-CM

## 2024-03-06 DIAGNOSIS — M51.26 RUPTURED LUMBAR DISC: Primary | ICD-10-CM

## 2024-03-06 LAB
GLUCOSE BLD MANUAL STRIP-MCNC: 339 MG/DL (ref 74–99)
GLUCOSE BLD MANUAL STRIP-MCNC: 358 MG/DL (ref 74–99)
GLUCOSE BLD MANUAL STRIP-MCNC: 362 MG/DL (ref 74–99)
GLUCOSE BLD MANUAL STRIP-MCNC: 377 MG/DL (ref 74–99)

## 2024-03-06 PROCEDURE — 2500000001 HC RX 250 WO HCPCS SELF ADMINISTERED DRUGS (ALT 637 FOR MEDICARE OP): Performed by: ANESTHESIOLOGY

## 2024-03-06 PROCEDURE — 3600000018 HC OR TIME - INITIAL BASE CHARGE - PROCEDURE LEVEL SIX: Performed by: ORTHOPAEDIC SURGERY

## 2024-03-06 PROCEDURE — 7100000010 HC PHASE TWO TIME - EACH INCREMENTAL 1 MINUTE: Performed by: ORTHOPAEDIC SURGERY

## 2024-03-06 PROCEDURE — 76000 FLUOROSCOPY <1 HR PHYS/QHP: CPT | Mod: 59

## 2024-03-06 PROCEDURE — 2500000002 HC RX 250 W HCPCS SELF ADMINISTERED DRUGS (ALT 637 FOR MEDICARE OP, ALT 636 FOR OP/ED): Performed by: ANESTHESIOLOGY

## 2024-03-06 PROCEDURE — 82947 ASSAY GLUCOSE BLOOD QUANT: CPT

## 2024-03-06 PROCEDURE — 2780000003 HC OR 278 NO HCPCS: Performed by: ORTHOPAEDIC SURGERY

## 2024-03-06 PROCEDURE — 7100000002 HC RECOVERY ROOM TIME - EACH INCREMENTAL 1 MINUTE: Performed by: ORTHOPAEDIC SURGERY

## 2024-03-06 PROCEDURE — 2720000007 HC OR 272 NO HCPCS: Performed by: ORTHOPAEDIC SURGERY

## 2024-03-06 PROCEDURE — 3700000002 HC GENERAL ANESTHESIA TIME - EACH INCREMENTAL 1 MINUTE: Performed by: ORTHOPAEDIC SURGERY

## 2024-03-06 PROCEDURE — 2500000004 HC RX 250 GENERAL PHARMACY W/ HCPCS (ALT 636 FOR OP/ED): Performed by: ANESTHESIOLOGIST ASSISTANT

## 2024-03-06 PROCEDURE — 2500000005 HC RX 250 GENERAL PHARMACY W/O HCPCS: Performed by: ANESTHESIOLOGY

## 2024-03-06 PROCEDURE — 2500000005 HC RX 250 GENERAL PHARMACY W/O HCPCS: Performed by: ANESTHESIOLOGIST ASSISTANT

## 2024-03-06 PROCEDURE — 7100000001 HC RECOVERY ROOM TIME - INITIAL BASE CHARGE: Performed by: ORTHOPAEDIC SURGERY

## 2024-03-06 PROCEDURE — 2500000004 HC RX 250 GENERAL PHARMACY W/ HCPCS (ALT 636 FOR OP/ED): Performed by: ANESTHESIOLOGY

## 2024-03-06 PROCEDURE — 2500000004 HC RX 250 GENERAL PHARMACY W/ HCPCS (ALT 636 FOR OP/ED): Performed by: ORTHOPAEDIC SURGERY

## 2024-03-06 PROCEDURE — 2500000005 HC RX 250 GENERAL PHARMACY W/O HCPCS: Performed by: ORTHOPAEDIC SURGERY

## 2024-03-06 PROCEDURE — A4217 STERILE WATER/SALINE, 500 ML: HCPCS | Performed by: ORTHOPAEDIC SURGERY

## 2024-03-06 PROCEDURE — 63030 LAMOT DCMPRN NRV RT 1 LMBR: CPT | Performed by: ORTHOPAEDIC SURGERY

## 2024-03-06 PROCEDURE — 7100000009 HC PHASE TWO TIME - INITIAL BASE CHARGE: Performed by: ORTHOPAEDIC SURGERY

## 2024-03-06 PROCEDURE — 63030 LAMOT DCMPRN NRV RT 1 LMBR: CPT | Performed by: PHYSICIAN ASSISTANT

## 2024-03-06 PROCEDURE — 2500000002 HC RX 250 W HCPCS SELF ADMINISTERED DRUGS (ALT 637 FOR MEDICARE OP, ALT 636 FOR OP/ED): Performed by: ANESTHESIOLOGIST ASSISTANT

## 2024-03-06 PROCEDURE — 3700000001 HC GENERAL ANESTHESIA TIME - INITIAL BASE CHARGE: Performed by: ORTHOPAEDIC SURGERY

## 2024-03-06 PROCEDURE — 3600000017 HC OR TIME - EACH INCREMENTAL 1 MINUTE - PROCEDURE LEVEL SIX: Performed by: ORTHOPAEDIC SURGERY

## 2024-03-06 PROCEDURE — C1765 ADHESION BARRIER: HCPCS | Performed by: ORTHOPAEDIC SURGERY

## 2024-03-06 RX ORDER — ROCURONIUM BROMIDE 10 MG/ML
INJECTION, SOLUTION INTRAVENOUS AS NEEDED
Status: DISCONTINUED | OUTPATIENT
Start: 2024-03-06 | End: 2024-03-06

## 2024-03-06 RX ORDER — OXYCODONE HYDROCHLORIDE 5 MG/1
5 TABLET ORAL EVERY 6 HOURS PRN
Qty: 28 TABLET | Refills: 0 | Status: SHIPPED | OUTPATIENT
Start: 2024-03-06 | End: 2024-03-13

## 2024-03-06 RX ORDER — OXYCODONE HYDROCHLORIDE 5 MG/1
5 TABLET ORAL EVERY 4 HOURS PRN
Status: CANCELLED | OUTPATIENT
Start: 2024-03-06

## 2024-03-06 RX ORDER — ACETAMINOPHEN 325 MG/1
650 TABLET ORAL EVERY 4 HOURS PRN
Status: DISCONTINUED | OUTPATIENT
Start: 2024-03-06 | End: 2024-03-06 | Stop reason: HOSPADM

## 2024-03-06 RX ORDER — ACETAMINOPHEN 325 MG/1
650 TABLET ORAL EVERY 4 HOURS PRN
Status: CANCELLED | OUTPATIENT
Start: 2024-03-06

## 2024-03-06 RX ORDER — MIDAZOLAM HYDROCHLORIDE 1 MG/ML
INJECTION INTRAMUSCULAR; INTRAVENOUS AS NEEDED
Status: DISCONTINUED | OUTPATIENT
Start: 2024-03-06 | End: 2024-03-06

## 2024-03-06 RX ORDER — HYDROGEN PEROXIDE 3 %
20 SOLUTION, NON-ORAL MISCELLANEOUS
COMMUNITY
Start: 2020-01-17

## 2024-03-06 RX ORDER — SODIUM CHLORIDE 0.9 G/100ML
IRRIGANT IRRIGATION AS NEEDED
Status: DISCONTINUED | OUTPATIENT
Start: 2024-03-06 | End: 2024-03-06 | Stop reason: HOSPADM

## 2024-03-06 RX ORDER — KETOROLAC TROMETHAMINE 10 MG/1
10 TABLET, FILM COATED ORAL EVERY 6 HOURS PRN
Qty: 15 TABLET | Refills: 0 | Status: SHIPPED | OUTPATIENT
Start: 2024-03-06 | End: 2024-03-11

## 2024-03-06 RX ORDER — MIDAZOLAM HYDROCHLORIDE 1 MG/ML
INJECTION, SOLUTION INTRAMUSCULAR; INTRAVENOUS AS NEEDED
Status: DISCONTINUED | OUTPATIENT
Start: 2024-03-06 | End: 2024-03-06

## 2024-03-06 RX ORDER — SODIUM CHLORIDE, SODIUM LACTATE, POTASSIUM CHLORIDE, CALCIUM CHLORIDE 600; 310; 30; 20 MG/100ML; MG/100ML; MG/100ML; MG/100ML
100 INJECTION, SOLUTION INTRAVENOUS CONTINUOUS
Status: DISCONTINUED | OUTPATIENT
Start: 2024-03-06 | End: 2024-03-06 | Stop reason: HOSPADM

## 2024-03-06 RX ORDER — LIDOCAINE HYDROCHLORIDE 20 MG/ML
INJECTION, SOLUTION INFILTRATION; PERINEURAL AS NEEDED
Status: DISCONTINUED | OUTPATIENT
Start: 2024-03-06 | End: 2024-03-06

## 2024-03-06 RX ORDER — LIDOCAINE HYDROCHLORIDE 10 MG/ML
0.1 INJECTION, SOLUTION EPIDURAL; INFILTRATION; INTRACAUDAL; PERINEURAL ONCE
Status: DISCONTINUED | OUTPATIENT
Start: 2024-03-06 | End: 2024-03-06 | Stop reason: HOSPADM

## 2024-03-06 RX ORDER — DEXAMETHASONE SODIUM PHOSPHATE 4 MG/ML
INJECTION, SOLUTION INTRA-ARTICULAR; INTRALESIONAL; INTRAMUSCULAR; INTRAVENOUS; SOFT TISSUE AS NEEDED
Status: DISCONTINUED | OUTPATIENT
Start: 2024-03-06 | End: 2024-03-06

## 2024-03-06 RX ORDER — METHOCARBAMOL 100 MG/ML
INJECTION, SOLUTION INTRAMUSCULAR; INTRAVENOUS AS NEEDED
Status: DISCONTINUED | OUTPATIENT
Start: 2024-03-06 | End: 2024-03-06

## 2024-03-06 RX ORDER — FENTANYL CITRATE 50 UG/ML
INJECTION, SOLUTION INTRAMUSCULAR; INTRAVENOUS AS NEEDED
Status: DISCONTINUED | OUTPATIENT
Start: 2024-03-06 | End: 2024-03-06

## 2024-03-06 RX ORDER — OXYCODONE HYDROCHLORIDE 5 MG/1
5 TABLET ORAL EVERY 4 HOURS PRN
Status: DISCONTINUED | OUTPATIENT
Start: 2024-03-06 | End: 2024-03-06 | Stop reason: HOSPADM

## 2024-03-06 RX ORDER — ONDANSETRON 4 MG/1
4 TABLET, FILM COATED ORAL EVERY 8 HOURS PRN
Qty: 15 TABLET | Refills: 0 | Status: SHIPPED | OUTPATIENT
Start: 2024-03-06 | End: 2024-03-11

## 2024-03-06 RX ORDER — PROPOFOL 10 MG/ML
INJECTION, EMULSION INTRAVENOUS AS NEEDED
Status: DISCONTINUED | OUTPATIENT
Start: 2024-03-06 | End: 2024-03-06

## 2024-03-06 RX ORDER — ONDANSETRON HYDROCHLORIDE 2 MG/ML
INJECTION, SOLUTION INTRAVENOUS AS NEEDED
Status: DISCONTINUED | OUTPATIENT
Start: 2024-03-06 | End: 2024-03-06

## 2024-03-06 RX ORDER — GENTAMICIN 40 MG/ML
INJECTION, SOLUTION INTRAMUSCULAR; INTRAVENOUS AS NEEDED
Status: DISCONTINUED | OUTPATIENT
Start: 2024-03-06 | End: 2024-03-06

## 2024-03-06 RX ORDER — SODIUM CHLORIDE, SODIUM LACTATE, POTASSIUM CHLORIDE, CALCIUM CHLORIDE 600; 310; 30; 20 MG/100ML; MG/100ML; MG/100ML; MG/100ML
100 INJECTION, SOLUTION INTRAVENOUS CONTINUOUS
Status: CANCELLED | OUTPATIENT
Start: 2024-03-06

## 2024-03-06 RX ORDER — LIDOCAINE 560 MG/1
PATCH PERCUTANEOUS; TOPICAL; TRANSDERMAL AS NEEDED
Status: DISCONTINUED | OUTPATIENT
Start: 2024-03-06 | End: 2024-03-06 | Stop reason: HOSPADM

## 2024-03-06 RX ORDER — ONDANSETRON HYDROCHLORIDE 2 MG/ML
4 INJECTION, SOLUTION INTRAVENOUS ONCE AS NEEDED
Status: DISCONTINUED | OUTPATIENT
Start: 2024-03-06 | End: 2024-03-06 | Stop reason: HOSPADM

## 2024-03-06 RX ORDER — ACETAMINOPHEN 500 MG
500 TABLET ORAL EVERY 6 HOURS PRN
Qty: 40 TABLET | Refills: 0 | Status: SHIPPED | OUTPATIENT
Start: 2024-03-06 | End: 2024-03-16

## 2024-03-06 RX ORDER — HYDROMORPHONE HYDROCHLORIDE 1 MG/ML
INJECTION, SOLUTION INTRAMUSCULAR; INTRAVENOUS; SUBCUTANEOUS AS NEEDED
Status: DISCONTINUED | OUTPATIENT
Start: 2024-03-06 | End: 2024-03-06

## 2024-03-06 RX ORDER — CEFAZOLIN 1 G/1
INJECTION, POWDER, FOR SOLUTION INTRAVENOUS AS NEEDED
Status: DISCONTINUED | OUTPATIENT
Start: 2024-03-06 | End: 2024-03-06

## 2024-03-06 RX ORDER — LIDOCAINE HYDROCHLORIDE 10 MG/ML
0.1 INJECTION, SOLUTION EPIDURAL; INFILTRATION; INTRACAUDAL; PERINEURAL ONCE
Status: CANCELLED | OUTPATIENT
Start: 2024-03-06 | End: 2024-03-06

## 2024-03-06 RX ORDER — METHOCARBAMOL 500 MG/1
500 TABLET, FILM COATED ORAL 4 TIMES DAILY PRN
Qty: 40 TABLET | Refills: 0 | Status: SHIPPED | OUTPATIENT
Start: 2024-03-06 | End: 2024-03-16

## 2024-03-06 RX ORDER — ONDANSETRON HYDROCHLORIDE 2 MG/ML
4 INJECTION, SOLUTION INTRAVENOUS ONCE AS NEEDED
Status: CANCELLED | OUTPATIENT
Start: 2024-03-06

## 2024-03-06 RX ORDER — BUPIVACAINE HCL/EPINEPHRINE 0.5-1:200K
VIAL (ML) INJECTION AS NEEDED
Status: DISCONTINUED | OUTPATIENT
Start: 2024-03-06 | End: 2024-03-06 | Stop reason: HOSPADM

## 2024-03-06 RX ORDER — DOCUSATE SODIUM 100 MG/1
100 CAPSULE, LIQUID FILLED ORAL 2 TIMES DAILY
Qty: 20 CAPSULE | Refills: 0 | Status: SHIPPED | OUTPATIENT
Start: 2024-03-06 | End: 2024-03-16

## 2024-03-06 RX ORDER — KETOROLAC TROMETHAMINE 30 MG/ML
INJECTION, SOLUTION INTRAMUSCULAR; INTRAVENOUS AS NEEDED
Status: DISCONTINUED | OUTPATIENT
Start: 2024-03-06 | End: 2024-03-06

## 2024-03-06 RX ADMIN — INSULIN HUMAN 5 UNITS: 100 INJECTION, SOLUTION PARENTERAL at 09:36

## 2024-03-06 RX ADMIN — ROCURONIUM BROMIDE 20 MG: 10 INJECTION, SOLUTION INTRAVENOUS at 08:13

## 2024-03-06 RX ADMIN — HYDROMORPHONE HYDROCHLORIDE 0.5 MG: 1 INJECTION, SOLUTION INTRAMUSCULAR; INTRAVENOUS; SUBCUTANEOUS at 08:54

## 2024-03-06 RX ADMIN — DEXAMETHASONE SODIUM PHOSPHATE 4 MG: 4 INJECTION, SOLUTION INTRAMUSCULAR; INTRAVENOUS at 08:37

## 2024-03-06 RX ADMIN — METHOCARBAMOL 1000 MG: 100 INJECTION INTRAMUSCULAR; INTRAVENOUS at 08:37

## 2024-03-06 RX ADMIN — PROPOFOL 200 MG: 10 INJECTION, EMULSION INTRAVENOUS at 07:39

## 2024-03-06 RX ADMIN — ONDANSETRON 4 MG: 2 INJECTION INTRAMUSCULAR; INTRAVENOUS at 08:34

## 2024-03-06 RX ADMIN — MIDAZOLAM HYDROCHLORIDE 2 MG: 1 INJECTION, SOLUTION INTRAMUSCULAR; INTRAVENOUS at 08:51

## 2024-03-06 RX ADMIN — FENTANYL CITRATE 25 MCG: 50 INJECTION, SOLUTION INTRAMUSCULAR; INTRAVENOUS at 07:56

## 2024-03-06 RX ADMIN — GENTAMICIN SULFATE 80 MG: 40 INJECTION, SOLUTION INTRAMUSCULAR; INTRAVENOUS at 07:49

## 2024-03-06 RX ADMIN — OXYCODONE HYDROCHLORIDE 5 MG: 5 TABLET ORAL at 10:36

## 2024-03-06 RX ADMIN — HYDROMORPHONE HYDROCHLORIDE 0.5 MG: 1 INJECTION, SOLUTION INTRAMUSCULAR; INTRAVENOUS; SUBCUTANEOUS at 09:46

## 2024-03-06 RX ADMIN — FENTANYL CITRATE 25 MCG: 50 INJECTION, SOLUTION INTRAMUSCULAR; INTRAVENOUS at 07:41

## 2024-03-06 RX ADMIN — FENTANYL CITRATE 50 MCG: 50 INJECTION, SOLUTION INTRAMUSCULAR; INTRAVENOUS at 08:11

## 2024-03-06 RX ADMIN — INSULIN HUMAN 5 UNITS: 100 INJECTION, SOLUTION PARENTERAL at 08:10

## 2024-03-06 RX ADMIN — CEFAZOLIN 2 G: 1 INJECTION, POWDER, FOR SOLUTION INTRAMUSCULAR; INTRAVENOUS at 07:49

## 2024-03-06 RX ADMIN — HYDROMORPHONE HYDROCHLORIDE 0.5 MG: 1 INJECTION, SOLUTION INTRAMUSCULAR; INTRAVENOUS; SUBCUTANEOUS at 09:33

## 2024-03-06 RX ADMIN — ROCURONIUM BROMIDE 50 MG: 10 INJECTION, SOLUTION INTRAVENOUS at 07:39

## 2024-03-06 RX ADMIN — SODIUM CHLORIDE, SODIUM LACTATE, POTASSIUM CHLORIDE, AND CALCIUM CHLORIDE: 600; 310; 30; 20 INJECTION, SOLUTION INTRAVENOUS at 07:14

## 2024-03-06 RX ADMIN — HYDROMORPHONE HYDROCHLORIDE 0.5 MG: 1 INJECTION, SOLUTION INTRAMUSCULAR; INTRAVENOUS; SUBCUTANEOUS at 08:14

## 2024-03-06 RX ADMIN — SUGAMMADEX 200 MG: 100 INJECTION, SOLUTION INTRAVENOUS at 08:50

## 2024-03-06 RX ADMIN — HYDROMORPHONE HYDROCHLORIDE 0.5 MG: 1 INJECTION, SOLUTION INTRAMUSCULAR; INTRAVENOUS; SUBCUTANEOUS at 10:11

## 2024-03-06 RX ADMIN — ROCURONIUM BROMIDE 10 MG: 10 INJECTION, SOLUTION INTRAVENOUS at 08:05

## 2024-03-06 RX ADMIN — KETOROLAC TROMETHAMINE 30 MG: 30 INJECTION, SOLUTION INTRAMUSCULAR; INTRAVENOUS at 08:37

## 2024-03-06 RX ADMIN — ROCURONIUM BROMIDE 20 MG: 10 INJECTION, SOLUTION INTRAVENOUS at 07:55

## 2024-03-06 RX ADMIN — HYDROMORPHONE HYDROCHLORIDE 0.5 MG: 1 INJECTION, SOLUTION INTRAMUSCULAR; INTRAVENOUS; SUBCUTANEOUS at 09:19

## 2024-03-06 RX ADMIN — LIDOCAINE HYDROCHLORIDE 100 ML: 20 INJECTION, SOLUTION INFILTRATION; PERINEURAL at 07:39

## 2024-03-06 SDOH — HEALTH STABILITY: MENTAL HEALTH: CURRENT SMOKER: 1

## 2024-03-06 ASSESSMENT — PAIN - FUNCTIONAL ASSESSMENT
PAIN_FUNCTIONAL_ASSESSMENT: 0-10

## 2024-03-06 ASSESSMENT — PAIN SCALES - GENERAL
PAINLEVEL_OUTOF10: 8
PAINLEVEL_OUTOF10: 5 - MODERATE PAIN
PAINLEVEL_OUTOF10: 4
PAINLEVEL_OUTOF10: 9
PAINLEVEL_OUTOF10: 7
PAINLEVEL_OUTOF10: 7

## 2024-03-06 ASSESSMENT — COLUMBIA-SUICIDE SEVERITY RATING SCALE - C-SSRS
6. HAVE YOU EVER DONE ANYTHING, STARTED TO DO ANYTHING, OR PREPARED TO DO ANYTHING TO END YOUR LIFE?: NO
2. HAVE YOU ACTUALLY HAD ANY THOUGHTS OF KILLING YOURSELF?: NO
1. IN THE PAST MONTH, HAVE YOU WISHED YOU WERE DEAD OR WISHED YOU COULD GO TO SLEEP AND NOT WAKE UP?: NO

## 2024-03-06 ASSESSMENT — PAIN DESCRIPTION - LOCATION: LOCATION: BACK

## 2024-03-06 NOTE — ANESTHESIA PROCEDURE NOTES
Airway  Date/Time: 3/6/2024 7:43 AM  Urgency: elective      Staffing  Performed: LISS   Authorized by: Too Muller MD    Performed by: LISS Castro  Patient location during procedure: OR    Indications and Patient Condition  Indications for airway management: anesthesia  Preoxygenated: yes  Patient position: sniffing  Mask difficulty assessment: 1 - vent by mask    Final Airway Details  Final airway type: endotracheal airway      Successful airway: ETT     Successful intubation technique: direct laryngoscopy  Blade: Ranjeet  Blade size: #4  ETT size (mm): 7.5  Cormack-Lehane Classification: grade I - full view of glottis  Measured from: gums  Number of attempts at approach: 1  Number of other approaches attempted: 0

## 2024-03-06 NOTE — ANESTHESIA PREPROCEDURE EVALUATION
Patient: Migel Singh    Procedure Information       Date/Time: 03/06/24 0730    Procedure: L5-S1 Lumbar Microdiscectomy (Spine Lumbar)    Location: [facility] A OR 07 / [facility] A OR    Surgeons: Gustavo Randolph MD            Relevant Problems   Neuro/Psych   (+) Lumbar back pain with radiculopathy affecting left lower extremity   (+) Lumbar radiculopathy   (+) Ulnar neuropathy of left upper extremity      Musculoskeletal   (+) Ruptured lumbar disc       Clinical information reviewed:   Tobacco  Allergies  Meds   Med Hx  Surg Hx   Fam Hx  Soc Hx        NPO/Void Status  Carbohydrate Drink Given Prior to Surgery? : N  Date of Last Liquid: 03/05/24  Time of Last Liquid: 1900  Date of Last Solid: 03/05/24  Time of Last Solid: 1900  Last Intake Type: Clear fluids  Time of Last Void: 0500           Past Medical History:   Diagnosis Date   • Anxiety    • Asthma    • Bipolar disorder, current episode manic without psychotic features, unspecified (CMS/[facility]) 03/25/2021    Bipolar affective, manic   • Depression    • Gastro-esophageal reflux disease with esophagitis, without bleeding 12/14/2022    Gastroesophageal reflux disease with esophagitis   • Lumbar disc disease       Past Surgical History:   Procedure Laterality Date   • ELBOW SURGERY  12/06/2013    Elbow Surgery   • WISDOM TOOTH EXTRACTION       Social History     Tobacco Use   • Smoking status: Never     Passive exposure: Never   • Smokeless tobacco: Never   • Tobacco comments:     vape   Vaping Use   • Vaping Use: Never used   Substance Use Topics   • Alcohol use: Yes     Alcohol/week: 4.0 standard drinks of alcohol     Types: 1 Cans of beer, 3 Shots of liquor per week     Comment: occasionally   • Drug use: Yes     Types: Marijuana      Current Outpatient Medications   Medication Instructions   • ALBUTEROL INHL 1 Dose, inhalation, Every 4 hours PRN   • blood-glucose sensor (Dexcom G6 Sensor) device To be changed every 10 days   • Dexcom G6 Transmitter  "device USE AS DIRECTED FOR CONTINUOUS GLUCOSE MONITORING CHANGE EVERY 3 MONTHS   • esomeprazole (NEXIUM) 20 mg, oral, Daily before breakfast   • FLUoxetine (PROZAC) 20 mg, oral, Daily   • insulin lispro (HumaLOG) 100 unit/mL injection subcutaneous, 3 times daily with meals, Take as directed per insulin instructions.   • insulin pump cart,auto,BT/cntr (OMNIPOD 5 G6 INTRO KIT, GEN 5, SUBQ) subcutaneous   • insulin pump cart,automated,BT (Omnipod 5 G6 Pods, Gen 5,) cartridge 1 each, subcutaneous, Every 72 hours   • lisdexamfetamine (VYVANSE) 70 mg, oral, Every morning   • meloxicam (MOBIC) 15 mg, oral, Daily      No Known Allergies     Chemistry    Lab Results   Component Value Date/Time     (L) 03/05/2024 0801    K 4.9 03/05/2024 0801    CL 89 (L) 03/05/2024 0801    CO2 26 03/05/2024 0801    BUN 19 03/05/2024 0801    CREATININE 1.04 03/05/2024 0801    Lab Results   Component Value Date/Time    CALCIUM 9.1 03/05/2024 0801    ALKPHOS 102 03/05/2024 0801    AST 17 03/05/2024 0801    ALT 22 03/05/2024 0801    BILITOT 0.4 03/05/2024 0801          Lab Results   Component Value Date/Time    WBC 6.5 03/05/2024 0801    HGB 15.9 03/05/2024 0801    HCT 45.7 03/05/2024 0801     03/05/2024 0801     Lab Results   Component Value Date/Time    PROTIME 10.3 03/05/2024 0801    INR 0.9 03/05/2024 0801     No results found for this or any previous visit (from the past 4464 hour(s)).  No results found for this or any previous visit from the past 1095 days.       Visit Vitals  /79   Pulse 76   Temp 36.5 °C (97.7 °F)   Resp 14   Ht 1.727 m (5' 8\")   Wt 71.6 kg (157 lb 13.6 oz)   SpO2 97%   BMI 24.00 kg/m²   Smoking Status Never   BSA 1.85 m²        Physical Exam     Anesthesia Plan    History of general anesthesia?: yes  History of complications of general anesthesia?: yes    ASA 2     general     The patient is a current smoker.    intravenous induction   Anesthetic plan and risks discussed with patient.  Use of blood " products discussed with patient who.    Plan discussed with CRNA.

## 2024-03-06 NOTE — PERIOPERATIVE NURSING NOTE
Patient ambulated to restroom and back without difficulty. Able to void. Patient dressed. Discharge instructions discussed with patient, wife, and mother. All questions answered. IV removed. Patient stable for discharge.

## 2024-03-06 NOTE — ANESTHESIA POSTPROCEDURE EVALUATION
Patient: Migel Singh    Procedure Summary       Date: 03/06/24 Room / Location: [facility] A OR 07 / [facility] A OR    Anesthesia Start: 0734 Anesthesia Stop: 0900    Procedure: L5-S1 Lumbar Microdiscectomy (Spine Lumbar) Diagnosis:       Lumbar radiculopathy      Ruptured lumbar disc      (Lumbar radiculopathy [M54.16])      (Ruptured lumbar disc [M51.26])    Surgeons: Gustavo Randolph MD Responsible Provider: Too Muller MD    Anesthesia Type: general ASA Status: 2            Anesthesia Type: general    Vitals Value Taken Time   /72 03/06/24 0901   Temp 98.9 03/06/24 0909   Pulse 90 03/06/24 0909   Resp 15 03/06/24 0909   SpO2 98 % 03/06/24 0909   Vitals shown include unvalidated device data.    Anesthesia Post Evaluation    Patient location during evaluation: bedside  Patient participation: complete - patient participated  Level of consciousness: awake  Pain management: adequate  Multimodal analgesia pain management approach  Airway patency: patent  Cardiovascular status: acceptable  Respiratory status: acceptable  Hydration status: acceptable  Postoperative Nausea and Vomiting: none      No notable events documented.

## 2024-03-06 NOTE — OP NOTE
L5-S1 Lumbar Microdiscectomy Operative Note     Date: 3/6/2024  OR Location: [facility] A OR    Name: Migel Singh, : 1995, Age: 28 y.o., MRN: [id number], Sex: male    Diagnosis  Pre-op Diagnosis     * Lumbar radiculopathy [M54.16]     * Ruptured lumbar disc [M51.26] Post-op Diagnosis     * Lumbar radiculopathy [M54.16]     * Ruptured lumbar disc [M51.26]     Procedures  L5-S1 Lumbar Microdiscectomy  20889 - ND LAMNOTMY INCL W/DCMPRSN NRV ROOT 1 INTRSPC LUMBR      Surgeons      * Gustavo Randolph - Primary    Resident/Fellow/Other Assistant:  Surgeon(s) and Role: Guillermina Pena PA-C    Procedure Summary  Anesthesia: General  ASA: II  Anesthesia Staff: Anesthesiologist: Too Muller MD  C-AA: LISS Castro  Estimated Blood Loss: 5mL  Intra-op Medications:   Administrations occurring from 0730 to 0900 on 24:   Medication Name Total Dose   thrombin (recombinant) (Recothrom) topical solution 10,000 Units   gelatin absorbable (Gelfoam) 100 sponge 1 each              Anesthesia Record               Intraprocedure I/O Totals       None           Specimen: No specimens collected     Staff:   Circulator: Kalpana Calderon RN  Scrub Person: Zehra Darling; Donnell Bridges RN         Drains and/or Catheters: * None in log *    Tourniquet Times:         Implants:         Indications: Migel Singh is an 28 y.o. male who is having surgery for Lumbar radiculopathy [M54.16]  Ruptured lumbar disc [M51.26].     The patient was seen in the preoperative area. The risks, benefits, complications, treatment options, non-operative alternatives, expected recovery and outcomes were discussed with the patient. The possibilities of reaction to medication, pulmonary aspiration, injury to surrounding structures, bleeding, recurrent infection, the need for additional procedures, failure to diagnose a condition, and creating a complication requiring transfusion or operation were discussed with the patient. The patient  concurred with the proposed plan, giving informed consent.  The site of surgery was properly noted/marked if necessary per policy. The patient has been actively warmed in preoperative area. Preoperative antibiotics have been ordered and given within 1 hours of incision. Venous thrombosis prophylaxis have been ordered including bilateral sequential compression devices    Procedure Details: Patient was taken to the operating room where a formal timeout was done according to hospital protocol.  Patient was intubated without difficulty.  Patient was given preoperative antibiotics.  Patient was positioned prone on Foreign table lumbar spine was prepped and draped in usual fashion.  C-arm fluoroscopy was used to identify the right L5-S1 interlaminar space.  Small incision was made in the appropriate retractor was placed.  We did a small right-sided hemilaminotomy.  The S1 nerve root was visualized it was then medialized.  There was a large posterior lateral disc herniation.  A cruciate annulotomy was performed and numerous fragments of disc were then extracted from the disc space.  We flushed within the disc base until there were no further free fragments and the thecal sac was decompressed both dorsally and ventrally.  Floseal was then placed.  The retractor was withdrawn.  Incision was closed in usual fashion sterile dressing was applied.    I was present and scrubbed for the entire procedure.  The physician assistant was present, scrubbed and participated in all portions of the procedure, as no qualified surgeon physician and/or resident surgeon was available to assist in the case.      Gustavo Randolph MD    Chief of Spine Surgery, [facility]  Director of Spine Service, [facility]  , Department of Orthopaedics  [facility] [employer]  [facility] [place] [address]  P: [phone]    This note was  dictated with voice recognition software.  It has not been proofread for grammatical errors, typographical mistakes or other semantic inconsistencies.    Complications:  None; patient tolerated the procedure well.    Disposition: PACU - hemodynamically stable.  Condition: stable             Attending Attestation:     Gustavo Randolph  Phone Number: [phone]

## 2024-03-06 NOTE — INTERVAL H&P NOTE
H&P reviewed. The patient was examined and there are no changes to the H&P.    Patient with large L5-S1 disc herniation with signs of neurologic dysfunction. He is a type 1 diabetic with hyperglycemia noted on labs yesterday and today. He will be treated appropriately and monitored.

## 2024-03-06 NOTE — DISCHARGE INSTRUCTIONS
Lumbar Decompression/Microdiscectomy/Hemilaminectomy and/or Lamincectomy (Dr. Randolph)_    REMEMBER:  ACTIVTY:  Move regularly. Avoid staying in any one position longer than 1-2 hours, ambulate/walk frequently while awake. This will help with stiffness.  May sleep however is most comfortable (in bed or recliner)    May add in over-the-counter NSAIDs (Advil, Aleve, Motrin, ibuprofen, naproxen, diclofenac, meloxicam, Celebrex, etc) to treat pain (after taking Ketorolac if prescribed)    RESTRICTIONS:  Do not drive for 24 hours after surgery or while taking narcotics/muscle relaxants   No pushing, pulling, or lifting of objects greater than 5-10 pounds for 3 weeks  A gallon of milk is 8 pounds for reference.   No extreme bending, twisting, or turning of low back  May move as needed for activities of daily living.     BLOOD THINNERS (anticoagulants, plavix, asprin, etc):  May restart 5 days after surgery or as directed by prescribing provider.     WOUND CARE:  Do not shower for 48 hours following surgery.   Keep surgical incision clean and dry until shower. Change with non-adherent dressing daily and as needed.  If no drainage, may cover or leave uncovered based on personal preference.   Do not remove Steri-Strips they will fall off on their own.   Any nylon sutures will be removed by provider at follow up visit.   Remove lidocaine patch after 12 hours.     DIET:  Continue on clear liquid diet until passing gas.  Then may resume regular diet.   Continue stool softeners until bowel movement.   If you do not have a bowel movement in 72 hours with addition ofmagnesium citrate, go to the Emergency Department.     REMEMBER:   It is normal to have post-surgical back pain/spasms, even with small incisions.   Ice and/or heat may be helpful.    It is normal to have coming and going nerve pain in the legs as the nerve heals.   Nerve pain may be replaced initially with numbness and tingling/ pins and needles.    Week to week,  post-surgical pain should become less often and less intense.   Continue medication as prescribed.      CALL PHYSICIAN:   Signs of infection at incision site:   progressive drainage or an unusual odor  increased redness and or swelling  increased pain and or tenderness    Excessive Bleeding:  Slow general oozing that completely soaks dressing  Fresh bright red bleeding or bleeding that will not stop.   If drainage continues beyond 5 days of surgery.    Inability to go to the bathroom    FOR PRESCRIPTION REFILLS GIVE 48 HOURS NOTICE. Please do not wait until Friday after 3 pm to call.    Follow-up 2-3 weeks from surgery for radiographs and suture removal.    Dr. Gustavo Randolph' Office  [facility] - : [phone]  [place] Voicemail (Foreign & Guillermina): [phone]  *Please leave Name, , & call back number

## 2024-03-08 ENCOUNTER — TELEPHONE (OUTPATIENT)
Dept: PREOP | Facility: HOSPITAL | Age: 29
End: 2024-03-08

## 2024-04-01 ENCOUNTER — OFFICE VISIT (OUTPATIENT)
Dept: ORTHOPEDIC SURGERY | Facility: CLINIC | Age: 29
End: 2024-04-01
Payer: COMMERCIAL

## 2024-04-01 VITALS — WEIGHT: 157 LBS | HEIGHT: 68 IN | BODY MASS INDEX: 23.79 KG/M2

## 2024-04-01 DIAGNOSIS — M54.16 LUMBAR RADICULOPATHY: Primary | ICD-10-CM

## 2024-04-01 PROCEDURE — 99024 POSTOP FOLLOW-UP VISIT: CPT | Performed by: PHYSICIAN ASSISTANT

## 2024-04-01 ASSESSMENT — PAIN - FUNCTIONAL ASSESSMENT: PAIN_FUNCTIONAL_ASSESSMENT: 0-10

## 2024-04-01 NOTE — PROGRESS NOTES
"HPI:  Migel Singh is a 28 y.o. male who presents today for initial postop visit after undergoing Right L5-S1 microdiscectomy on 03/06/24 with Dr. Randolph.    Overall, patient is doing great. His previous radicular pain has resolved. He reports \"cramping\" sensations and intermittent tingling sensations bilateral calves/lower extremities. No focal motor weakness.   No issues with the wound. He has already returned to work and is doing well.    ROS:  Reviewed on EMR and patient intake sheet.    PMH/SH:  Reviewed on EMR and patient intake sheet.    Exam:  Well appearing, NAD  Pleasant & cooperative  BMI 23.87  Decreased ROM lumbar spine with rotation, flexion/extension  No paraspinal muscle spasms  lower extremity sensation intact  lower extremity motor 5/5 throughout  normal gait & station    lumbar wound healing well without signs of infection. Well approximated without redness, palpable fluid collection or drainage.   Sutures removed without complication.    Radiology:     none    Assessment and Plan:  Lumbar radiculopathy    Both the patient and I are very pleased with his recovery so far. We reviewed Xrays in detail today.    We reviewed wound care in detail today. Okay to leave incision open to air. Okay to shower letting warm, soapy water run down the wound, do not scrub, pat dry. Okay to apply small amount of hydrogen peroxide nightly to scabs if needed.     Okay to lift up to 25 pounds until 6 weeks postop, then may increase lifting as tolerated. Continue to limit excessive bending/lifting/twisting at the waist, however okay to do gentle ROM exercises to help reduce stiffness & increase mobility. No strenuous activity such as running/jumping/heavy lifting/activities that strain low back until 6 weeks postop. Encourage ambulating as tolerated. If needed okay to start outpatient physical therapy at 6-8 weeks postop.    Discussed postop pain medication today and recommend transitioning from opioids to plain " tylenol or nsaids.    Plan to see patient for followup as needed. All questions answered. Patient in agreement to plan of care. Of course Migel Singh can call at anytime with questions or concerns.     Guillermina Pena PA-C  Department of Orthopaedic Surgery    [address] [place] [address]    Voicemail: [phone]   Appts: [phone]  Fax: [phone]

## 2024-04-02 DIAGNOSIS — E10.9 TYPE 1 DIABETES MELLITUS WITHOUT COMPLICATION (MULTI): ICD-10-CM

## 2024-04-02 RX ORDER — INSULIN LISPRO 100 [IU]/ML
INJECTION, SOLUTION INTRAVENOUS; SUBCUTANEOUS
Qty: 40 ML | Refills: 5 | Status: SHIPPED | OUTPATIENT
Start: 2024-04-02

## 2024-04-02 RX ORDER — INSULIN PMP CART,AUT,G6/7,CNTR
1 EACH SUBCUTANEOUS
Qty: 10 EACH | Refills: 11 | Status: SHIPPED | OUTPATIENT
Start: 2024-04-02 | End: 2025-04-02

## 2024-04-02 RX ORDER — BLOOD-GLUCOSE SENSOR
EACH MISCELLANEOUS
Qty: 10 EACH | Refills: 11 | Status: SHIPPED | OUTPATIENT
Start: 2024-04-02

## 2024-04-02 RX ORDER — INSULIN LISPRO 100 [IU]/ML
INJECTION, SOLUTION INTRAVENOUS; SUBCUTANEOUS
Qty: 10 ML | Status: CANCELLED | OUTPATIENT
Start: 2024-04-02

## 2024-04-02 RX ORDER — INSULIN LISPRO 100 [IU]/ML
INJECTION, SOLUTION INTRAVENOUS; SUBCUTANEOUS
COMMUNITY

## 2024-04-02 NOTE — TELEPHONE ENCOUNTER
Patient states he has been unable to get omnipod of dexcom due to insurance denial. Patient asks that a rx for humalog kwikpens be sent to [facility].

## 2024-04-02 NOTE — TELEPHONE ENCOUNTER
[facility] called to advise that patient's PA for Omnipod 5 G6 pods and Dexcom G7 sensors will require a new PA.  Previous PA is .  [id number]  [facility]  [facility]-[id number]  ID# [id number]    PA initiated on Cover my meds for Omnipod pods and Dexcom sensors.

## 2024-04-02 NOTE — TELEPHONE ENCOUNTER
PA for Omnipod pods and Dexcom sensors are approved by insurance.  I called to advise patient and he states that he will no longer require the Kwikpen prescription, as he only needed this until PA was approved.   Faxed approval for both sensors and pods to [facility].

## 2024-04-03 DIAGNOSIS — E10.9 TYPE 1 DIABETES MELLITUS WITHOUT COMPLICATION (MULTI): Primary | ICD-10-CM

## 2024-04-03 RX ORDER — BLOOD-GLUCOSE TRANSMITTER
EACH MISCELLANEOUS
Qty: 1 EACH | Refills: 4 | Status: SHIPPED | OUTPATIENT
Start: 2024-04-03

## 2024-04-16 DIAGNOSIS — E10.9 TYPE 1 DIABETES MELLITUS WITHOUT COMPLICATION (MULTI): Primary | ICD-10-CM

## 2024-04-16 RX ORDER — NAPROXEN SODIUM 220 MG
TABLET ORAL
Qty: 100 EACH | Refills: 11 | Status: SHIPPED | OUTPATIENT
Start: 2024-04-16 | End: 2025-04-16

## 2024-04-16 NOTE — TELEPHONE ENCOUNTER
Patient is experiencing some issues with his Omnipod connectivity.  He is going to contact [employer] support, but he will need some insulin syringes sent to the pharmacy to use while he is working on his Omnipod. Pended syringes for approval.

## 2024-04-22 NOTE — PATIENT INSTRUCTIONS
Thank you for choosing [facility]  for your health care needs.  If you have any questions, concerns or medical needs, please feel free to contact our office at [phone].    Please ensure you complete your blood work one week before the next scheduled appointment.    For follow up in 3-4 months   To continue the use of your Dexcom CGM for the intensive glucose monitoring due to the dynamic nature of insulin requirements, the narrow therapeutic index of insulin and the potentially fatal consequences of treatment  To continue the use of the  Omnipod insulin pump   Counseled that the goal A1C should be 7% or less  Counseled glycemic control is warranted to prevent microvascular complications  Counseled that the time in range should be >70%

## 2024-04-22 NOTE — PROGRESS NOTES
"Subjective   Migel Singh is a 28 y.o. male who presents for a follow-up evaluation of Type 1 diabetes mellitus. The initial diagnosis of diabetes was made  at the age of 24 years .     Since his last appointment, he ruptured L5 disc and had surgery six weeks ago.  His legs were numb and tinling as S1 was compressed.  His right lateral foot is still numb.      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    He has not had the Dexcom CGM for one month.  He got a new phone but this was not compatible with the Dexcom CGM.  He was having noctuira too many to count except last night.      Known complications due to diabetes included none    Cardiovascular risk factors include diabetes mellitus and male gender. The patient is not on an ACE inhibitor or angiotensin II receptor blocker.  The patient has not been previously hospitalized due to diabetic ketoacidosis.     Current symptoms/problems include hyperglycemia. His clinical course has been stable.     The patient is currently checking the blood glucose multiple times per day.    Patient is using: continuous glucose monitor                                                     MEALS:   Breakfast - omits  Lunch - goes home for luch; granola bar   Dinner6:30pm- chicken, steak, potaotes  Snacks - fiber one bars, slim Rambo,   Beverages - energy drinks, Monster every 2 days, water with flavored packets, Gatorade zero, regular soda, coffee with creamer      Denies exercise regimen     Review of Systems   Constitutional:  Positive for diaphoresis (Night sweats).   Eyes:  Positive for pain and visual disturbance.   Musculoskeletal:  Positive for back pain.   Skin:  Positive for rash.   Neurological:  Positive for numbness.        Tingling    All other systems reviewed and are negative.      Objective   /88 (BP Location: Left arm, Patient Position: Sitting, BP Cuff Size: Adult)   Pulse 82   Ht 1.727 m (5' 8\")   Wt 74.8 kg (165 lb)   BMI 25.09 kg/m² " "  Physical Exam  Vitals and nursing note reviewed.   Constitutional:       General: He is not in acute distress.     Appearance: Normal appearance. He is normal weight.   HENT:      Head: Normocephalic and atraumatic.      Nose: Nose normal.      Mouth/Throat:      Mouth: Mucous membranes are moist.   Eyes:      Extraocular Movements: Extraocular movements intact.   Cardiovascular:      Rate and Rhythm: Normal rate and regular rhythm.   Pulmonary:      Effort: Pulmonary effort is normal.      Breath sounds: Normal breath sounds.   Musculoskeletal:         General: Normal range of motion.      Right foot: Normal range of motion. No deformity.      Left foot: Normal range of motion. No deformity.   Feet:      Right foot:      Skin integrity: Skin integrity normal. No ulcer or blister.      Toenail Condition: Right toenails are normal.      Left foot:      Skin integrity: Skin integrity normal. No ulcer or blister.      Toenail Condition: Left toenails are normal.   Skin:     General: Skin is warm.   Neurological:      Mental Status: He is alert and oriented to person, place, and time.   Psychiatric:         Mood and Affect: Mood normal.         Lab Review  Lab Results   Component Value Date    HGBA1C 12.6 (A) 12/19/2023     Lab Results   Component Value Date    GLUCOSE 782 (HH) 03/05/2024    CALCIUM 9.1 03/05/2024     (L) 03/05/2024    K 4.9 03/05/2024    CO2 26 03/05/2024    CL 89 (L) 03/05/2024    BUN 19 03/05/2024    CREATININE 1.04 03/05/2024     Lab Results   Component Value Date    CHOL 185 02/23/2023    CHOL 171 03/25/2021    CHOL 168 02/25/2020     Lab Results   Component Value Date    HDL 48.9 02/23/2023    HDL 41.2 03/25/2021    HDL 45.1 02/25/2020     No results found for: \"LDLCALC\"  Lab Results   Component Value Date    TRIG 112 02/23/2023    TRIG 87 03/25/2021    TRIG 133 02/25/2020     Health Maintenance:   Foot Exam: April 23, 2024  Eye Exam:  Urine Albumin:  February 23, 2023    CGM " Interpretation/Plan   14 day CGM download was reviewed in detail as documented above and will be attached to chart.  A minimum of 72 hours of glucose data was used to inform the management plan outlined below.  15% of values is within target range.    Assessment/Plan      28-year-old presents for follow up for type 1 diabetes mellitus. His blood pressure is at goal today.    Poorly controlled type 1 diabetes mellitus (Multi)  For follow up in 3-4 months   To continue the use of your Dexcom CGM for the intensive glucose monitoring due to the dynamic nature of insulin requirements, the narrow therapeutic index of insulin and the potentially fatal consequences of treatment  To continue the use of the  Omnipod insulin pump   Counseled that the goal A1C should be 7% or less  Counseled glycemic control is warranted to prevent microvascular complications  Counseled that the time in range should be >70%  To obtain fasting blood and urine test    Insulin pump status  To change pump sites every three days

## 2024-04-23 ENCOUNTER — OFFICE VISIT (OUTPATIENT)
Dept: ENDOCRINOLOGY | Facility: CLINIC | Age: 29
End: 2024-04-23
Payer: COMMERCIAL

## 2024-04-23 VITALS
DIASTOLIC BLOOD PRESSURE: 88 MMHG | HEART RATE: 82 BPM | WEIGHT: 165 LBS | SYSTOLIC BLOOD PRESSURE: 128 MMHG | BODY MASS INDEX: 25.01 KG/M2 | HEIGHT: 68 IN

## 2024-04-23 DIAGNOSIS — Z96.41 INSULIN PUMP STATUS: Primary | ICD-10-CM

## 2024-04-23 DIAGNOSIS — E10.9 TYPE 1 DIABETES MELLITUS WITHOUT COMPLICATION (MULTI): ICD-10-CM

## 2024-04-23 PROCEDURE — 3074F SYST BP LT 130 MM HG: CPT | Performed by: INTERNAL MEDICINE

## 2024-04-23 PROCEDURE — 95251 CONT GLUC MNTR ANALYSIS I&R: CPT | Performed by: INTERNAL MEDICINE

## 2024-04-23 PROCEDURE — 3079F DIAST BP 80-89 MM HG: CPT | Performed by: INTERNAL MEDICINE

## 2024-04-23 PROCEDURE — 99214 OFFICE O/P EST MOD 30 MIN: CPT | Performed by: INTERNAL MEDICINE

## 2024-04-23 ASSESSMENT — ENCOUNTER SYMPTOMS
DIAPHORESIS: 1
NUMBNESS: 1
EYE PAIN: 1
BACK PAIN: 1

## 2024-04-28 NOTE — ASSESSMENT & PLAN NOTE
For follow up in 3-4 months   To continue the use of your Dexcom CGM for the intensive glucose monitoring due to the dynamic nature of insulin requirements, the narrow therapeutic index of insulin and the potentially fatal consequences of treatment  To continue the use of the  Omnipod insulin pump   Counseled that the goal A1C should be 7% or less  Counseled glycemic control is warranted to prevent microvascular complications  Counseled that the time in range should be >70%  To obtain fasting blood and urine test

## 2024-07-29 ENCOUNTER — APPOINTMENT (OUTPATIENT)
Dept: ENDOCRINOLOGY | Facility: CLINIC | Age: 29
End: 2024-07-29
Payer: COMMERCIAL

## 2024-07-29 VITALS
HEART RATE: 86 BPM | DIASTOLIC BLOOD PRESSURE: 70 MMHG | HEIGHT: 68 IN | WEIGHT: 165 LBS | BODY MASS INDEX: 25.01 KG/M2 | SYSTOLIC BLOOD PRESSURE: 120 MMHG

## 2024-07-29 DIAGNOSIS — Z96.41 INSULIN PUMP STATUS: Primary | ICD-10-CM

## 2024-07-29 DIAGNOSIS — E10.9 TYPE 1 DIABETES MELLITUS WITHOUT COMPLICATION (MULTI): ICD-10-CM

## 2024-07-29 PROCEDURE — 3074F SYST BP LT 130 MM HG: CPT | Performed by: INTERNAL MEDICINE

## 2024-07-29 PROCEDURE — 3078F DIAST BP <80 MM HG: CPT | Performed by: INTERNAL MEDICINE

## 2024-07-29 PROCEDURE — 99214 OFFICE O/P EST MOD 30 MIN: CPT | Performed by: INTERNAL MEDICINE

## 2024-07-29 PROCEDURE — 3008F BODY MASS INDEX DOCD: CPT | Performed by: INTERNAL MEDICINE

## 2024-07-29 ASSESSMENT — ENCOUNTER SYMPTOMS
BACK PAIN: 1
FATIGUE: 1
DIARRHEA: 0

## 2024-07-29 NOTE — PROGRESS NOTES
"Subjective   Migel Singh is a 28 y.o. male who presents for a follow-up evaluation of Type 1 diabetes mellitus. The initial diagnosis of diabetes was made  at the age of 24 years .     He go ta [facility] and it would not connect to the Dexcom until most recently.      He could not feel his legs and had urinary incontinence as he had a ruptured lumbar disc.  He underwent L5-S1 lumbar microdissectomy in March 2024.      He underwent a root canal but this got infectied.  He had oral surgery three weks ago     Known complications due to diabetes included none    Cardiovascular risk factors include diabetes mellitus and male gender. The patient is not on an ACE inhibitor or angiotensin II receptor blocker.  The patient has not been previously hospitalized due to diabetic ketoacidosis.     Current symptoms/problems include hyperglycemia. His clinical course has been stable.     The patient is currently checking the blood glucose multiple times per day.    Patient is using: continuous glucose monitor                                                Hypoglycemic frequency: 0%  Hypoglycemic awareness:  Yes     MEALS:   Breakfast - omits  Lunch - goes home for luch; crackers and rice today   Dinner can be as late as 9pm- chicken, burger, broccoli, french fries, fast food, occasionally salad   Snacks - fiber one bars, slim Rambo,   Beverages - energy drinks, Monster every 2 days, water with flavored packets, Gatorade zero, regular soda, coffee with creamer      Denies exercise regimen     Review of Systems   Constitutional:  Positive for fatigue.   Eyes:  Positive for visual disturbance.   Gastrointestinal:  Negative for diarrhea.   Musculoskeletal:  Positive for back pain (s/p surgery 3-4 months ago).   All other systems reviewed and are negative.      Objective   /70 (BP Location: Left arm, Patient Position: Sitting, BP Cuff Size: Adult)   Pulse 86   Ht 1.727 m (5' 8\")   Wt 74.8 kg (165 lb)   BMI 25.09 kg/m² " "  Physical Exam  Vitals and nursing note reviewed.   Constitutional:       General: He is not in acute distress.     Appearance: Normal appearance. He is normal weight.   HENT:      Head: Normocephalic and atraumatic.      Nose: Nose normal.      Mouth/Throat:      Mouth: Mucous membranes are moist.   Eyes:      Extraocular Movements: Extraocular movements intact.   Cardiovascular:      Rate and Rhythm: Normal rate and regular rhythm.   Pulmonary:      Effort: Pulmonary effort is normal.      Breath sounds: Normal breath sounds.   Musculoskeletal:         General: Normal range of motion.   Feet:      Right foot:      Toenail Condition: Right toenails are normal.      Left foot:      Toenail Condition: Left toenails are normal.   Skin:     General: Skin is warm.   Neurological:      Mental Status: He is alert and oriented to person, place, and time.   Psychiatric:         Mood and Affect: Mood normal.         Lab Review  Lab Results   Component Value Date    HGBA1C 12.6 (A) 12/19/2023     Lab Results   Component Value Date    GLUCOSE 782 (HH) 03/05/2024    CALCIUM 9.1 03/05/2024     (L) 03/05/2024    K 4.9 03/05/2024    CO2 26 03/05/2024    CL 89 (L) 03/05/2024    BUN 19 03/05/2024    CREATININE 1.04 03/05/2024     Lab Results   Component Value Date    CHOL 185 02/23/2023    CHOL 171 03/25/2021    CHOL 168 02/25/2020     Lab Results   Component Value Date    HDL 48.9 02/23/2023    HDL 41.2 03/25/2021    HDL 45.1 02/25/2020     No results found for: \"LDLCALC\"  Lab Results   Component Value Date    TRIG 112 02/23/2023    TRIG 87 03/25/2021    TRIG 133 02/25/2020     Health Maintenance:   Foot Exam: April 23, 2024  Eye Exam:  Urine Albumin:  February 23, 2023    Assessment/Plan      28-year-old presents for follow up for type 1 diabetes mellitus. His blood pressure is at goal today.    Type 1 diabetes mellitus without complication (Multi)  To obtain blood tests which were ordered in April   To continue the use of " your Dexcom CGM for the intensive glucose monitoring due to the dynamic nature of insulin requirements, the narrow therapeutic index of insulin and the potentially fatal consequences of treatment  To continue the use of the  Omnipod insulin pump   Counseled that the goal A1C should be 7% or less  Counseled glycemic control is warranted to prevent microvascular complications  Counseled that the time in range should be >70%      Insulin pump status  To change pump sites every three days    For follow up in 4 months

## 2024-07-29 NOTE — PATIENT INSTRUCTIONS
Thank you for choosing [facility]  for your health care needs.  If you have any questions, concerns or medical needs, please feel free to contact our office at [phone].    Please ensure you complete your blood work one week before the next scheduled appointment.    To obtain blood tests which were ordered in April   To continue the use of your Dexcom CGM for the intensive glucose monitoring due to the dynamic nature of insulin requirements, the narrow therapeutic index of insulin and the potentially fatal consequences of treatment  To continue the use of the  Omnipod insulin pump   Counseled that the goal A1C should be 7% or less  Counseled glycemic control is warranted to prevent microvascular complications  Counseled that the time in range should be >70%  For follow up in 4 months

## 2024-07-30 NOTE — ASSESSMENT & PLAN NOTE
To obtain blood tests which were ordered in April   To continue the use of your Dexcom CGM for the intensive glucose monitoring due to the dynamic nature of insulin requirements, the narrow therapeutic index of insulin and the potentially fatal consequences of treatment  To continue the use of the  Omnipod insulin pump   Counseled that the goal A1C should be 7% or less  Counseled glycemic control is warranted to prevent microvascular complications  Counseled that the time in range should be >70%

## 2024-08-21 ENCOUNTER — TRANSCRIBE ORDERS (OUTPATIENT)
Dept: ORTHOPEDIC SURGERY | Facility: HOSPITAL | Age: 29
End: 2024-08-21
Payer: COMMERCIAL

## 2024-08-21 DIAGNOSIS — M54.16 LUMBAR RADICULOPATHY: ICD-10-CM

## 2024-08-23 ENCOUNTER — HOSPITAL ENCOUNTER (OUTPATIENT)
Dept: RADIOLOGY | Facility: CLINIC | Age: 29
Discharge: HOME | End: 2024-08-23
Payer: COMMERCIAL

## 2024-08-23 ENCOUNTER — APPOINTMENT (OUTPATIENT)
Dept: ORTHOPEDIC SURGERY | Facility: CLINIC | Age: 29
End: 2024-08-23
Payer: COMMERCIAL

## 2024-08-23 VITALS — HEIGHT: 68 IN | BODY MASS INDEX: 25.01 KG/M2 | WEIGHT: 165 LBS

## 2024-08-23 DIAGNOSIS — M51.36 DISCOGENIC LOW BACK PAIN: ICD-10-CM

## 2024-08-23 DIAGNOSIS — M54.16 LUMBAR RADICULOPATHY: ICD-10-CM

## 2024-08-23 PROCEDURE — 3008F BODY MASS INDEX DOCD: CPT | Performed by: ORTHOPAEDIC SURGERY

## 2024-08-23 PROCEDURE — 99213 OFFICE O/P EST LOW 20 MIN: CPT | Performed by: ORTHOPAEDIC SURGERY

## 2024-08-23 PROCEDURE — 72110 X-RAY EXAM L-2 SPINE 4/>VWS: CPT

## 2024-08-23 ASSESSMENT — PAIN - FUNCTIONAL ASSESSMENT: PAIN_FUNCTIONAL_ASSESSMENT: 0-10

## 2024-08-23 NOTE — PROGRESS NOTES
HPI:Migel Singh is a 28-year-old man who has a past medical history significant for remote microdiscectomy.  He comes in today with some activity related back pain.  He notices that the back pain is worse with forward bending.  He denies any leg pain.  He admits that he feels way better than that prior to surgery.  He has not done any recent physical therapy or core strengthening.      ROS:  Reviewed on EMR and patient intake sheet.    PMH/SH:  Reviewed on EMR and patient intake sheet.    Exam:  Physical Exam    Constitutional: Well appearing; no acute distress  Eyes: pupils are equal and round  Psych: normal affect  Respiratory: non-labored breathing  Cardiovascular: regular rate and rhythm  GI: non-distended abdomen  Musculoskeletal: no pain with range of motion of the hips bilaterally  Neurologic: [5]/5 strength in the lower extremities bilaterally]; [negative] straight leg raise    Radiology:     X-rays demonstrate moderate disc degeneration L5-S1    Diagnosis:    Discogenic back pain    Assessment and Plan:   28-year-old man with discogenic back pain.  I explained the natural history of disc degeneration particularly in a post disc herniation setting.  I encouraged continued nonoperative management with a focus on core strengthening and activity modification.  I can see him back as needed.    The patient was in agreement with the plan. At the end of the visit today, the patient felt that all questions had been answered satisfactorily.  The patient was pleased with the visit and very appreciative for the care rendered.     Thank you very much for the kind referral.  It is a privilege, and a pleasure, to partner with you in the care of your patients.  I would be delighted to assist you with any further consultations as needed.          Gustavo Randolph MD    Chief of Spine Surgery, [facility]  Director of Spine Service, [facility]  Associate  Professor, Department of Orthopaedics  [facility]  [address] [place] [address]  P: [phone]  [email or web address]    This note was dictated with voice recognition software.  It has not been proofread for grammatical errors, typographical mistakes or other semantic inconsistencies.

## 2024-08-23 NOTE — LETTER
August 23, 2024     Gold Solorio DO  [address]  [facility]  [facility] [address]    Patient: Migel Singh   YOB: 1995   Date of Visit: 8/23/2024       Dear Dr. Gold Solorio, :    Thank you for referring Migel Singh to me for evaluation. Below are my notes for this consultation.  If you have questions, please do not hesitate to call me. I look forward to following your patient along with you.       Sincerely,     Gustavo Randolph MD      CC: No Recipients  ______________________________________________________________________________________    HPI:Migel Singh is a 28-year-old man who has a past medical history significant for remote microdiscectomy.  He comes in today with some activity related back pain.  He notices that the back pain is worse with forward bending.  He denies any leg pain.  He admits that he feels way better than that prior to surgery.  He has not done any recent physical therapy or core strengthening.      ROS:  Reviewed on EMR and patient intake sheet.    PMH/SH:  Reviewed on EMR and patient intake sheet.    Exam:  Physical Exam    Constitutional: Well appearing; no acute distress  Eyes: pupils are equal and round  Psych: normal affect  Respiratory: non-labored breathing  Cardiovascular: regular rate and rhythm  GI: non-distended abdomen  Musculoskeletal: no pain with range of motion of the hips bilaterally  Neurologic: [5]/5 strength in the lower extremities bilaterally]; [negative] straight leg raise    Radiology:     X-rays demonstrate moderate disc degeneration L5-S1    Diagnosis:    Discogenic back pain    Assessment and Plan:   28-year-old man with discogenic back pain.  I explained the natural history of disc degeneration particularly in a post disc herniation setting.  I encouraged continued nonoperative management with a focus on core strengthening and activity modification.  I can see him back as needed.    The patient was  in agreement with the plan. At the end of the visit today, the patient felt that all questions had been answered satisfactorily.  The patient was pleased with the visit and very appreciative for the care rendered.     Thank you very much for the kind referral.  It is a privilege, and a pleasure, to partner with you in the care of your patients.  I would be delighted to assist you with any further consultations as needed.          Gustavo Randolph MD    Chief of Spine Surgery, [facility]  Director of Spine Service, [facility]  , Department of Orthopaedics  [facility]  [address] [place] [address]  P: [phone]  [email or web address]    This note was dictated with voice recognition software.  It has not been proofread for grammatical errors, typographical mistakes or other semantic inconsistencies.

## 2024-09-23 ENCOUNTER — EVALUATION (OUTPATIENT)
Dept: PHYSICAL THERAPY | Facility: CLINIC | Age: 29
End: 2024-09-23
Payer: COMMERCIAL

## 2024-09-23 DIAGNOSIS — M51.36 DISCOGENIC LOW BACK PAIN: ICD-10-CM

## 2024-09-23 DIAGNOSIS — M54.16 LUMBAR RADICULOPATHY: Primary | ICD-10-CM

## 2024-09-23 PROCEDURE — 97161 PT EVAL LOW COMPLEX 20 MIN: CPT | Mod: GP | Performed by: PHYSICAL THERAPIST

## 2024-09-23 PROCEDURE — 97110 THERAPEUTIC EXERCISES: CPT | Mod: GP | Performed by: PHYSICAL THERAPIST

## 2024-09-23 ASSESSMENT — ENCOUNTER SYMPTOMS
DEPRESSION: 0
LOSS OF SENSATION IN FEET: 0
OCCASIONAL FEELINGS OF UNSTEADINESS: 0

## 2024-09-23 NOTE — PROGRESS NOTES
Physical Therapy Evaluation    Patient Name: Migel Singh  MRN: [id number]  Today's Date: 9/23/2024  Visit: 1  Referred by: Dr. Randolph  Diagnosis:   1. Lumbar radiculopathy        PRECAUTIONS:   none    SUBJECTIVE:  28 y.o. english speaking male with Hx of L5-S1 microdiscectomy by Dr. Randolph 3-6-24.  States LE pain resolved after surgery.  LBP never resolved.   LBP increased ~3 months ago.  Worse with prolonged sitting, forward bending or sustained flexed postures. Also getting up off floor.    Pain:  L) lumbar and buttock pain 3-8/10  Home Living:   lives in their 2 story home.  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  Personal factors that may impact care:  none  OBJECTIVE:  5/5 bilateral LE myotome strength  L) LBP with SLR at 60*  Mild limitation of trunk flexion and extension with c/o EPR.  Reps of flexion pain worsened and stayed worse.  Reps of Extension pain same but mildly decreased after.  R) SG pain increased at end range, Worse with reps  L) SG- ERP, Better with reps  DB- worse with reps  Prone lying- better 4/10  KELL lying- better, 3/10  REIL- ERP, Better after.  Pain lessened with assuming neutral spine sitting.    Outcome Measure:  Oswestry- 56%    ASSESSMENT:  Pt. With LBP L) more so than R).  He is s/p microdiscectomy.  He has pain worse with sitting and bending forward.  I will start him on and extension exercise program and maintinence of his lumbar lordosis with sitting.  Problem list:  see above    Low complexity due to patient's clinical presentation being stable and uncomplicated by any significant comorbidities that may affect rehab tolerance and progression.     Clinical presentation:  Stable and/or uncomplicated characteristics,     TREATMENT:  - Therex:  Prone lying 3 min.  KELL lying x 5 min  REIL  3x10  RUBIA 3x10    PATIENT EDUCATION:  HEP  Pt. Instructed on and practiced neutral spine sitting.    PLAN:   Daily HEP : EIL or EIS 1-2 sets  of 10 every 1-2 hours of day.  Strict sitting posture correction.  F/U PT weekly x 8 weeks to progress towards PT goals.  Rehab potential: Good  Plan of care agreement: Y    GOALS:  Active       PT Problem       Pt will have improved pain free mobility to assist with ADLs       Start:  09/23/24    Expected End:  11/29/24            Pt will have improved core muscle strength to assist with ADLs       Start:  09/23/24    Expected End:  11/29/24            Pt will be independent with HEP       Start:  09/23/24    Expected End:  10/25/24            Pt will have decreased reports of pain by a least 2 levels        Start:  09/23/24    Expected End:  10/31/24            Pt will have improved Oswestry score by at least 15%       Start:  09/23/24    Expected End:  11/29/24

## 2024-09-30 ENCOUNTER — TREATMENT (OUTPATIENT)
Dept: PHYSICAL THERAPY | Facility: CLINIC | Age: 29
End: 2024-09-30
Payer: COMMERCIAL

## 2024-09-30 DIAGNOSIS — M51.360 DISCOGENIC LOW BACK PAIN: ICD-10-CM

## 2024-09-30 DIAGNOSIS — M54.16 LUMBAR RADICULOPATHY: Primary | ICD-10-CM

## 2024-09-30 PROCEDURE — 97110 THERAPEUTIC EXERCISES: CPT | Mod: GP,CQ | Performed by: PHYSICAL THERAPY ASSISTANT

## 2024-09-30 NOTE — PROGRESS NOTES
"  Physical Therapy Treatment    Patient Name: Migel Singh  MRN: [id number]    Today's Date: 9/30/2024  Time Calculation  Start Time: 1645  Stop Time: 1730  Time Calculation (min): 45 min  Insurance:  Visit number: 2/4  Insurance Type: Payor: ANTHEM / Plan: ANTHEM HMP / Product Type: *No Product type* /   Authorization or Plan of Care date Range: DATES 09/23/24-10/22/24       Diagnosis:   1. Lumbar radiculopathy  Follow Up In Physical Therapy      2. Discogenic low back pain  Follow Up In Physical Therapy        Hx of L5-S1 microdiscectomy by Dr. Randolph 3-6-24   Precautions:  none  Fall Risk: None    PAIN: 0-6/10    HEP: Y    SUBJECTIVE:  Pt enters into therapy reporting a fluctuating back pain from 0-6/10 grade level.     OBJECTIVE:  No symptoms present with extension based exercises.     TREATMENT:  - Therex:  Elliptical 5'  Hip abd/ext (2) x15  Stand LS ext x20    Table  KELL x6'  PPU 3x10  Prone active ext x15  H/L bridges 5\"x15  Supine HS str with strap 20\"x3    - Manual Therapy:  NP  - Neuromuscular Re-education:   NP  - Gait Train:  NP  - Modalities:    NP    ASSESSMENT:   General keith to Rx. Pain remained stabilized w/o exacerbation.     PLAN:   Add iso walkouts if keith.        Billing:     PT Therapeutic Procedures Time Entry  Therapeutic Exercise Time Entry: 45                    "

## 2024-10-07 ENCOUNTER — TREATMENT (OUTPATIENT)
Dept: PHYSICAL THERAPY | Facility: CLINIC | Age: 29
End: 2024-10-07
Payer: COMMERCIAL

## 2024-10-07 DIAGNOSIS — M54.16 LUMBAR RADICULOPATHY: ICD-10-CM

## 2024-10-07 DIAGNOSIS — M51.360 DISCOGENIC LOW BACK PAIN: ICD-10-CM

## 2024-10-07 PROCEDURE — 97110 THERAPEUTIC EXERCISES: CPT | Mod: GP | Performed by: PHYSICAL THERAPIST

## 2024-10-07 NOTE — PROGRESS NOTES
Physical Therapy Treatment    Patient Name: Migel Singh  MRN: [id number]  Today's Date: 10/7/2024  Visit: 3  Referred by: Dr. Randolph  Diagnosis:   1. Lumbar radiculopathy  Follow Up In Physical Therapy      2. Discogenic low back pain  Follow Up In Physical Therapy      PRECAUTIONS:   none    SUBJECTIVE:  28 y.o. male with Hx of L5-S1 microdiscectomy by Dr. Randolph 3-6-24.  He returns for F/U for LBP that has persisted after surgery.  Pain has not changed since initial session with myself.  HEP every 2-4 hours per patient of EIL or EIS.  Pain:  Today:  L) lumbar pain 6/10    Personal factors that may impact care:  none  OBJECTIVE:  L) LBP with SLR at 60*  Mild limitation of trunk flexion and extension with c/o EPR.  Reps of flexion pain worsened and stayed worse.  Reps of Extension pain same but mildly decreased after.  R) SG pain increased at end range, Worse with reps  L) SG- ERP, Better with reps  DB- worse with reps  Prone lying- better 4/10  KELL lying- better, 3/10  REIL- ERP, Better after.    Outcome Measure:  Oswestry- 56%    ASSESSMENT:  Pt. Experienced centralization of pain at  2-3/10 with lateral and saggital forces as noted above.  Pt. Will continue extension exercise program with addition of L) SG and flexion/ROT L) and maintinence of his lumbar lordosis with sitting.      Clinical presentation:  Stable and/or uncomplicated characteristics,     TREATMENT:  - Therex:  L) SG 3x10  EIS 3x10  Prone lying 3 min.  KELL lying x 5 min  REIL  4x10    Flexion-ROT mobilization L)  x 3    PLAN:   Daily HEP : Flex/ROT L) and EIL or  ) SG and EIS 2 sets of 10 every 1-2 hours of day.  Strict sitting posture correction.  F/U next week and progress with extension based ex's as able.  Start on stabilization ex's soon.    GOALS:  Active       PT Problem       Pt will have improved pain free mobility to assist with ADLs       Start:  09/23/24    Expected End:  11/29/24            Pt will have improved core muscle  strength to assist with ADLs       Start:  09/23/24    Expected End:  11/29/24            Pt will be independent with HEP       Start:  09/23/24    Expected End:  10/25/24            Pt will have decreased reports of pain by a least 2 levels        Start:  09/23/24    Expected End:  10/31/24            Pt will have improved Oswestry score by at least 15%       Start:  09/23/24    Expected End:  11/29/24

## 2024-10-14 ENCOUNTER — TREATMENT (OUTPATIENT)
Dept: PHYSICAL THERAPY | Facility: CLINIC | Age: 29
End: 2024-10-14
Payer: COMMERCIAL

## 2024-10-14 DIAGNOSIS — M54.16 LUMBAR RADICULOPATHY: Primary | ICD-10-CM

## 2024-10-14 DIAGNOSIS — M51.360 DISCOGENIC LOW BACK PAIN: ICD-10-CM

## 2024-10-14 PROCEDURE — 97110 THERAPEUTIC EXERCISES: CPT | Mod: GP | Performed by: PHYSICAL THERAPIST

## 2024-10-14 NOTE — PROGRESS NOTES
"Physical Therapy Treatment    Patient Name: Migel Singh  MRN: [id number]  Today's Date: 10/14/2024  Visit: 4  Referred by: Dr. Randolph  Diagnosis:   1. Lumbar radiculopathy        2. Discogenic low back pain        PRECAUTIONS:   none    SUBJECTIVE:  28 y.o. male with Hx of L5-S1 microdiscectomy by Dr. Randolph 3-6-24.  He returns for F/U for LBP that has persisted after surgery.  Pain has not been about the same.  L) LBP 6-8/10.  He does report improved mobility of back since initial session.  He has not been as consistent with his HEP this week.   He states he does L) SG and EIS the most.   Pain:  Today:  L) lumbar pain 6/10    Personal factors that may impact care:  none  OBJECTIVE:  L) LBP with SLR at 60*  Mild limitation of trunk flexion and extension with c/o EPR.  Reps of flexion pain worsened and stayed worse.  Reps of Extension pain same but mildly decreased after.  R) SG pain increased at end range, Worse with reps  L) SG- ERP, Better with reps  DB- worse with reps  Prone lying- better    KELL lying- better   REIL- ERP, Central bilat. LBP after .    Outcome Measure:  Oswestry- 56%,  current 46%    ASSESSMENT:  Pt. Tolerated stabilization/core ex's without issues.  Pt. Will continue extension exercise program with addition of L) SG and flexion/ROT L) and maintinence of his lumbar lordosis with sitting.      Clinical presentation:  Stable and/or uncomplicated characteristics,     TREATMENT:  - Therex:  L) SG 3x10  EIS 3x10  Flex/ROT in L) SL x 3 min  Flex.ROT L) mobilization  Prone lying 3 min.  KELL lying x 3 min  REIL  4x10  Abdominal brace  5\" x 10  Tband hip ABD G  5\" x 10  TB ABD bridges  G  5\" x 10    PLAN:   Daily HEP :  L) SG and EIS 2 sets of 10 every 1-2 hours of day.  Strict sitting posture correction.  F/U next week and progress with extension based ex's as able.  Start stabilization ex's once daily.    GOALS:  Active       PT Problem       Pt will have improved pain free mobility to assist " with ADLs (Progressing)       Start:  09/23/24    Expected End:  11/29/24            Pt will have improved core muscle strength to assist with ADLs (Progressing)       Start:  09/23/24    Expected End:  11/29/24            Pt will be independent with HEP (Met)       Start:  09/23/24    Expected End:  10/25/24    Resolved:  10/14/24         Pt will have decreased reports of pain by a least 2 levels  (Progressing)       Start:  09/23/24    Expected End:  10/31/24            Pt will have improved Oswestry score by at least 15% (Met)       Start:  09/23/24    Expected End:  11/29/24    Resolved:  10/14/24

## 2024-10-21 ENCOUNTER — TREATMENT (OUTPATIENT)
Dept: PHYSICAL THERAPY | Facility: CLINIC | Age: 29
End: 2024-10-21
Payer: COMMERCIAL

## 2024-10-21 DIAGNOSIS — M54.16 LUMBAR RADICULOPATHY: Primary | ICD-10-CM

## 2024-10-21 DIAGNOSIS — M51.360 DISCOGENIC LOW BACK PAIN: ICD-10-CM

## 2024-10-21 PROCEDURE — 97110 THERAPEUTIC EXERCISES: CPT | Mod: GP,CQ | Performed by: PHYSICAL THERAPY ASSISTANT

## 2024-10-21 NOTE — PROGRESS NOTES
"Physical Therapy Treatment    Patient Name: Migel Singh  MRN: [id number]  Today's Date: 10/21/2024  Visit: 5  Referred by: Dr. Randolph  Diagnosis:   1. Lumbar radiculopathy        2. Discogenic low back pain          PRECAUTIONS:   none    SUBJECTIVE:  28 y.o. male with Hx of L5-S1 microdiscectomy by Dr. Randolph 3-6-24.  He returns for F/U for LBP that has persisted after surgery.  Pain has not been about the same.  L) LBP 6-8/10.  He reports most pain in the AM and while getting up at night going to the bathroom.   He has not been as consistent with his HEP this week.   He states he does L) SG and EIS the most.   Pain:  Today:  L) lumbar pain 6/10    Personal factors that may impact care:  none  OBJECTIVE:  L) LBP with SLR at 60*  Mild limitation of trunk flexion and extension with c/o EPR.  Reps of flexion pain worsened and stayed worse.  Reps of Extension pain same but mildly decreased after.  R) SG pain increased at end range, Worse with reps  L) SG- ERP, Better with reps  DB- worse with reps  Prone lying- better    KELL lying- better   REIL- ERP, Central bilat. LBP after .    Outcome Measure:  Oswestry- 56%,  current 46%    ASSESSMENT:  Improved keith to prone and extension based activities. Cont to struggle with his back depending on his job duties at work with bending and twisting. He tries to avoid those type of positions as much as he can and to use proper lifting techniques. Did not attempt flexion activities this session.     Clinical presentation:  Stable and/or uncomplicated characteristics,     TREATMENT:  - Therex:  L) SG 3x10  EIS 3x10  Flex/ROT in L) SL x 3 min  Flex.ROT L) mobilization  KELL lying x 3 min  PPU 2x10  REIL  4x10  Abdominal brace  5\" x 15  Tband hip ABD BL  5\" x 20  TB ABD bridges  BL 5\" x 15  T-band iso walkouts Red x10ea    PLAN:   Daily HEP :  L) SG and EIS 2 sets of 10 every 1-2 hours of day.  Strict sitting posture correction.  F/U next week and progress with extension based " ex's as able.  Start stabilization ex's once daily.    GOALS:  Active       PT Problem       Pt will have improved pain free mobility to assist with ADLs (Progressing)       Start:  09/23/24    Expected End:  11/29/24            Pt will have improved core muscle strength to assist with ADLs (Progressing)       Start:  09/23/24    Expected End:  11/29/24            Pt will be independent with HEP (Met)       Start:  09/23/24    Expected End:  10/25/24    Resolved:  10/14/24         Pt will have decreased reports of pain by a least 2 levels  (Progressing)       Start:  09/23/24    Expected End:  10/31/24            Pt will have improved Oswestry score by at least 15% (Met)       Start:  09/23/24    Expected End:  11/29/24    Resolved:  10/14/24

## 2024-10-24 NOTE — PROGRESS NOTES
Subjective   Migel Singh is a 29 y.o. male who presents for a follow-up evaluation of Type 1 diabetes mellitus. The initial diagnosis of diabetes was made  at the age of 24 years .     He has had no major changes to his health since his last appointmnet.      His dexcom CGMs have not been connecting.  He is awaiting replacement sensors in the mail.  He states that at times he can feel sluggish with the use of the insulin pump.    Known complications due to diabetes included none    Cardiovascular risk factors include diabetes mellitus and male gender. The patient is not on an ACE inhibitor or angiotensin II receptor blocker.  The patient has not been previously hospitalized due to diabetic ketoacidosis.     Current symptoms/problems include hyperglycemia. His clinical course has been stable.     The patient is currently checking the blood glucose multiple times per day.    Patient is using: continuous glucose monitor      Hypoglycemic frequency: once per day   Hypoglycemic awareness:  Yes     MEALS:   Breakfast - omits  Lunch - goes home for lunch  Dinner can be as late as 9pm- chicken, burger, broccoli, french fries, fast food, occasionally salad   Snacks - fiber one bars, slim Rambo,   Beverages - energy drinks, Monster every 2 days, water with flavored packets, Gatorade zero, regular soda, coffee with creamer      Denies exercise regimen     Review of Systems   All other systems reviewed and are negative.      Objective   There were no vitals taken for this visit.  Physical Exam  Vitals and nursing note reviewed.   Constitutional:       General: He is not in acute distress.     Appearance: Normal appearance. He is normal weight.   HENT:      Head: Normocephalic and atraumatic.      Nose: Nose normal.      Mouth/Throat:      Mouth: Mucous membranes are moist.   Eyes:      Extraocular Movements: Extraocular movements intact.   Pulmonary:      Effort: Pulmonary effort is normal.   Feet:      Right foot:       "Toenail Condition: Right toenails are normal.      Left foot:      Toenail Condition: Left toenails are normal.   Neurological:      Mental Status: He is alert and oriented to person, place, and time.   Psychiatric:         Mood and Affect: Mood normal.         Lab Review  Lab Results   Component Value Date    HGBA1C 12.6 (A) 12/19/2023     Lab Results   Component Value Date    GLUCOSE 782 (HH) 03/05/2024    CALCIUM 9.1 03/05/2024     (L) 03/05/2024    K 4.9 03/05/2024    CO2 26 03/05/2024    CL 89 (L) 03/05/2024    BUN 19 03/05/2024    CREATININE 1.04 03/05/2024     Lab Results   Component Value Date    CHOL 185 02/23/2023    CHOL 171 03/25/2021    CHOL 168 02/25/2020     Lab Results   Component Value Date    HDL 48.9 02/23/2023    HDL 41.2 03/25/2021    HDL 45.1 02/25/2020     No results found for: \"LDLCALC\"  Lab Results   Component Value Date    TRIG 112 02/23/2023    TRIG 87 03/25/2021    TRIG 133 02/25/2020     Health Maintenance:   Foot Exam: April 23, 2024  Eye Exam:  Urine Albumin:  February 23, 2023    Assessment/Plan      29-year-old presents for follow up for type 1 diabetes mellitus.     Type 1 diabetes mellitus without complication (Multi)  To obtain blood tests which were ordered in April   To continue the use of your Dexcom CGM for the intensive glucose monitoring due to the dynamic nature of insulin requirements, the narrow therapeutic index of insulin and the potentially fatal consequences of treatment  To continue the use of the  Omnipod insulin pump   To change carb ratios as follows:  MN to 11am - 12 grams  11am to 4pm - 14 grams  4pm to MN - 12 grams  Counseled that the goal A1C should be 7% or less  Counseled glycemic control is warranted to prevent microvascular complications  Counseled that the time in range should be >70%      Insulin pump titration  Adjustments as noted above    For follow up in 4 months    "

## 2024-10-24 NOTE — PATIENT INSTRUCTIONS
Thank you for choosing [facility]  for your health care needs.  If you have any questions, concerns or medical needs, please feel free to contact our office at [phone].    Please ensure you complete your blood work one week before the next scheduled appointment.    To obtain blood tests which were ordered in April   To continue the use of your Dexcom CGM for the intensive glucose monitoring due to the dynamic nature of insulin requirements, the narrow therapeutic index of insulin and the potentially fatal consequences of treatment  To continue the use of the  Omnipod insulin pump   To change carb ratios as follows:  MN to 11am - 12 grams  11am to 4pm - 14 grams  4pm to MN - 12 grams  Counseled that the goal A1C should be 7% or less  Counseled glycemic control is warranted to prevent microvascular complications  Counseled that the time in range should be >70%  For follow up in 4 months

## 2024-10-25 ENCOUNTER — APPOINTMENT (OUTPATIENT)
Dept: ENDOCRINOLOGY | Facility: CLINIC | Age: 29
End: 2024-10-25
Payer: COMMERCIAL

## 2024-10-25 DIAGNOSIS — E10.9 TYPE 1 DIABETES MELLITUS WITHOUT COMPLICATION: Primary | ICD-10-CM

## 2024-10-25 DIAGNOSIS — Z46.81 INSULIN PUMP TITRATION: ICD-10-CM

## 2024-10-25 PROCEDURE — 1036F TOBACCO NON-USER: CPT | Performed by: INTERNAL MEDICINE

## 2024-10-25 PROCEDURE — 99213 OFFICE O/P EST LOW 20 MIN: CPT | Performed by: INTERNAL MEDICINE

## 2024-10-27 NOTE — ASSESSMENT & PLAN NOTE
To obtain blood tests which were ordered in April   To continue the use of your Dexcom CGM for the intensive glucose monitoring due to the dynamic nature of insulin requirements, the narrow therapeutic index of insulin and the potentially fatal consequences of treatment  To continue the use of the  Omnipod insulin pump   To change carb ratios as follows:  MN to 11am - 12 grams  11am to 4pm - 14 grams  4pm to MN - 12 grams  Counseled that the goal A1C should be 7% or less  Counseled glycemic control is warranted to prevent microvascular complications  Counseled that the time in range should be >70%

## 2024-10-28 ENCOUNTER — APPOINTMENT (OUTPATIENT)
Dept: PHYSICAL THERAPY | Facility: CLINIC | Age: 29
End: 2024-10-28
Payer: COMMERCIAL

## 2024-11-06 ENCOUNTER — APPOINTMENT (OUTPATIENT)
Dept: PHYSICAL THERAPY | Facility: CLINIC | Age: 29
End: 2024-11-06
Payer: COMMERCIAL

## 2025-03-03 DIAGNOSIS — E10.9 TYPE 1 DIABETES MELLITUS WITHOUT COMPLICATION: ICD-10-CM

## 2025-03-03 RX ORDER — BLOOD-GLUCOSE SENSOR
EACH MISCELLANEOUS
Qty: 9 EACH | Refills: 3 | Status: SHIPPED | OUTPATIENT
Start: 2025-03-03

## 2025-03-03 NOTE — TELEPHONE ENCOUNTER
Mr. Singh called in and would allison a refill for his Dexcom G6 sensors sent to [facility] #[id number].    Next appointment 3/4/25.

## 2025-03-04 ENCOUNTER — APPOINTMENT (OUTPATIENT)
Dept: ENDOCRINOLOGY | Facility: CLINIC | Age: 30
End: 2025-03-04
Payer: COMMERCIAL

## 2025-03-28 ENCOUNTER — DOCUMENTATION (OUTPATIENT)
Dept: PHYSICAL THERAPY | Facility: CLINIC | Age: 30
End: 2025-03-28
Payer: COMMERCIAL

## 2025-03-28 NOTE — PROGRESS NOTES
Physical Therapy    Discharge Summary    Name: Migel Singh  MRN: [id number]  : 1995  Date: 24    Discharge Summary: PT    Discharge Information: Date of discharge 3-28-25 and Date of last visit 10-21-24    Therapy Summary:   Pt has been discharged from Physical Therapy Services. Please see last treatment note for details.

## 2025-04-29 DIAGNOSIS — E10.9 TYPE 1 DIABETES MELLITUS WITHOUT COMPLICATIONS: ICD-10-CM

## 2025-04-29 RX ORDER — INSULIN PMP CART,AUT,G6/7,CNTR
EACH SUBCUTANEOUS
Qty: 10 EACH | Refills: 11 | Status: SHIPPED | OUTPATIENT
Start: 2025-04-29

## 2025-06-12 DIAGNOSIS — E10.9 TYPE 1 DIABETES MELLITUS WITHOUT COMPLICATION: ICD-10-CM

## 2025-06-12 RX ORDER — BLOOD-GLUCOSE TRANSMITTER
EACH MISCELLANEOUS
Qty: 1 EACH | Refills: 4 | Status: SHIPPED | OUTPATIENT
Start: 2025-06-12

## 2025-06-12 NOTE — TELEPHONE ENCOUNTER
Migel would like a refill on Dexcom G6 transmitter sent to [facility] #[id number].    Next appointment 11/11/25

## 2025-09-26 ENCOUNTER — APPOINTMENT (OUTPATIENT)
Dept: ENDOCRINOLOGY | Facility: CLINIC | Age: 30
End: 2025-09-26
Payer: COMMERCIAL

## 2025-11-11 ENCOUNTER — APPOINTMENT (OUTPATIENT)
Dept: ENDOCRINOLOGY | Facility: CLINIC | Age: 30
End: 2025-11-11
Payer: COMMERCIAL

## (undated) DEVICE — KIT, MINOR, DOUBLE BASIN

## (undated) DEVICE — TRAY, DRY PREP, PREMIUM

## (undated) DEVICE — STAPLER, SKIN PROXIMATE, 35 WIDE

## (undated) DEVICE — FLOSEAL, MATRIX, HEMOSTATIC, FULL STERILE PREP, 5ML

## (undated) DEVICE — SUTURE, ETHILON, 2-0, FSLX 30, BLACK

## (undated) DEVICE — DRAPE, INCISE, ANTIMICROBIAL, IOBAN 2, STERI DRAPE, 23 X 33 IN, DISPOSABLE, STERILE

## (undated) DEVICE — STRIP, SKIN CLOSURE, STERI STRIP, REINFORCED, 0.5 X 4 IN

## (undated) DEVICE — SUTURE, PDS II, 0, 27 IN, CT1, VIOLET

## (undated) DEVICE — GLOVE, SURGICAL, PROTEXIS PI MICRO, 7.5, PF, LF

## (undated) DEVICE — SUTURE, PDS II, 2-0, 27 IN, SH, VIOLET

## (undated) DEVICE — DRAPE, C-ARM, W/12 IN COVER, LI XTRAY TUBE

## (undated) DEVICE — TOWEL, SURGICAL, NEURO, O/R, 16 X 26, BLUE, STERILE

## (undated) DEVICE — APPLICATOR, CHLORAPREP, W/ORANGE TINT, 26ML

## (undated) DEVICE — ELECTRODE, ELECTROSURGICAL, BLADE, INSULATED, ENT/IMA, STERILE

## (undated) DEVICE — BUR, 3MM PRECISION MATCH HEAD, 16CM

## (undated) DEVICE — Device

## (undated) DEVICE — DRESSING, ADHESIVE, ISLAND, TELFA, 2 X 3.75 IN, LF

## (undated) DEVICE — DIFFUSER, MAESTRO, CORE

## (undated) DEVICE — ADHESIVE, SKIN, LIQUIBAND EXCEED

## (undated) DEVICE — TIP,  ELECTRODE COATED INSULATED, EXTENDED, LF

## (undated) DEVICE — DRAPE, MICROSCOPE, FOR ZEISS 65MM, VARI-LENS II, 52 X 150

## (undated) DEVICE — ADHESIVE, SKIN, MASTISOL, 2/3 CC VIAL

## (undated) DEVICE — KIT, PATIENT CARE, JACKSON TABLE W/PRONE-SAFE HEADREST

## (undated) DEVICE — SPONGE, LAP, XRAY DECT, 4IN X 18IN, W/MASTER DMT, STERILE

## (undated) DEVICE — SOLUTION, IRRIGATION, SODIUM CHLORIDE 0.9%, 1000 ML, POUR BOTTLE

## (undated) DEVICE — GLOVE, SURGICAL, PROTEXIS PI ORTHO, 8.0, PF, LF

## (undated) DEVICE — CARTRIDGE, MAESTRO OIL, CORE